# Patient Record
Sex: MALE | Race: WHITE | Employment: OTHER
[De-identification: names, ages, dates, MRNs, and addresses within clinical notes are randomized per-mention and may not be internally consistent; named-entity substitution may affect disease eponyms.]

---

## 2017-01-19 ENCOUNTER — CARE COORDINATION (OUTPATIENT)
Dept: CARE COORDINATION | Facility: CLINIC | Age: 77
End: 2017-01-19

## 2017-05-02 ENCOUNTER — OFFICE VISIT (OUTPATIENT)
Dept: INTERNAL MEDICINE CLINIC | Age: 77
End: 2017-05-02
Payer: MEDICARE

## 2017-05-02 VITALS
OXYGEN SATURATION: 98 % | HEART RATE: 98 BPM | DIASTOLIC BLOOD PRESSURE: 60 MMHG | SYSTOLIC BLOOD PRESSURE: 118 MMHG | HEIGHT: 70 IN | RESPIRATION RATE: 16 BRPM | WEIGHT: 202 LBS | BODY MASS INDEX: 28.92 KG/M2 | TEMPERATURE: 98.2 F

## 2017-05-02 DIAGNOSIS — J44.9 CHRONIC OBSTRUCTIVE PULMONARY DISEASE, UNSPECIFIED COPD TYPE (HCC): ICD-10-CM

## 2017-05-02 DIAGNOSIS — Z98.890 S/P DISCECTOMY: ICD-10-CM

## 2017-05-02 DIAGNOSIS — M54.2 NECK PAIN: Primary | ICD-10-CM

## 2017-05-02 DIAGNOSIS — I48.0 PAROXYSMAL ATRIAL FIBRILLATION (HCC): ICD-10-CM

## 2017-05-02 DIAGNOSIS — M54.50 CHRONIC MIDLINE LOW BACK PAIN WITHOUT SCIATICA: ICD-10-CM

## 2017-05-02 DIAGNOSIS — G62.9 NEUROPATHY: ICD-10-CM

## 2017-05-02 DIAGNOSIS — I20.9 ANGINA PECTORIS (HCC): ICD-10-CM

## 2017-05-02 DIAGNOSIS — G89.29 CHRONIC MIDLINE LOW BACK PAIN WITHOUT SCIATICA: ICD-10-CM

## 2017-05-02 DIAGNOSIS — G47.00 INSOMNIA, UNSPECIFIED TYPE: ICD-10-CM

## 2017-05-02 PROCEDURE — 99215 OFFICE O/P EST HI 40 MIN: CPT | Performed by: INTERNAL MEDICINE

## 2017-05-02 RX ORDER — TRAZODONE HYDROCHLORIDE 100 MG/1
100 TABLET ORAL NIGHTLY
Qty: 30 TABLET | Refills: 0 | Status: SHIPPED | OUTPATIENT
Start: 2017-05-02 | End: 2017-10-09 | Stop reason: SDUPTHER

## 2017-05-02 ASSESSMENT — PATIENT HEALTH QUESTIONNAIRE - PHQ9
1. LITTLE INTEREST OR PLEASURE IN DOING THINGS: 0
SUM OF ALL RESPONSES TO PHQ9 QUESTIONS 1 & 2: 0
SUM OF ALL RESPONSES TO PHQ QUESTIONS 1-9: 0
2. FEELING DOWN, DEPRESSED OR HOPELESS: 0

## 2017-06-09 ENCOUNTER — TELEPHONE (OUTPATIENT)
Dept: INTERNAL MEDICINE CLINIC | Age: 77
End: 2017-06-09

## 2017-06-20 ENCOUNTER — HOSPITAL ENCOUNTER (OUTPATIENT)
Dept: MRI IMAGING | Age: 77
Discharge: HOME | End: 2017-06-20
Payer: MEDICARE

## 2017-06-20 DIAGNOSIS — G89.29 CHRONIC MIDLINE LOW BACK PAIN WITHOUT SCIATICA: ICD-10-CM

## 2017-06-20 DIAGNOSIS — M54.50 CHRONIC MIDLINE LOW BACK PAIN WITHOUT SCIATICA: ICD-10-CM

## 2017-06-20 DIAGNOSIS — G47.00 INSOMNIA, UNSPECIFIED TYPE: ICD-10-CM

## 2017-06-20 DIAGNOSIS — M54.2 NECK PAIN: ICD-10-CM

## 2017-06-20 DIAGNOSIS — Z98.890 S/P DISCECTOMY: ICD-10-CM

## 2017-06-20 DIAGNOSIS — G62.9 NEUROPATHY: ICD-10-CM

## 2017-06-20 LAB
CREAT SERPL-MCNC: 0.9 MG/DL (ref 0.7–1.2)
GFR AFRICAN AMERICAN: >60 ML/MIN
GFR NON-AFRICAN AMERICAN: >60 ML/MIN
GFR SERPL CREATININE-BSD FRML MDRD: NORMAL ML/MIN/{1.73_M2}
GFR SERPL CREATININE-BSD FRML MDRD: NORMAL ML/MIN/{1.73_M2}

## 2017-06-20 PROCEDURE — 72156 MRI NECK SPINE W/O & W/DYE: CPT

## 2017-06-20 PROCEDURE — 36415 COLL VENOUS BLD VENIPUNCTURE: CPT

## 2017-06-20 PROCEDURE — 6360000004 HC RX CONTRAST MEDICATION: Performed by: INTERNAL MEDICINE

## 2017-06-20 PROCEDURE — 72157 MRI CHEST SPINE W/O & W/DYE: CPT

## 2017-06-20 PROCEDURE — 82565 ASSAY OF CREATININE: CPT

## 2017-06-20 PROCEDURE — A9579 GAD-BASE MR CONTRAST NOS,1ML: HCPCS | Performed by: INTERNAL MEDICINE

## 2017-06-20 PROCEDURE — 2580000003 HC RX 258: Performed by: INTERNAL MEDICINE

## 2017-06-20 RX ORDER — SODIUM CHLORIDE 0.9 % (FLUSH) 0.9 %
10 SYRINGE (ML) INJECTION ONCE
Status: COMPLETED | OUTPATIENT
Start: 2017-06-20 | End: 2017-06-20

## 2017-06-20 RX ADMIN — GADOPENTETATE DIMEGLUMINE 18 ML: 469.01 INJECTION INTRAVENOUS at 14:37

## 2017-06-20 RX ADMIN — Medication 10 ML: at 14:42

## 2017-09-05 ENCOUNTER — OFFICE VISIT (OUTPATIENT)
Dept: INTERNAL MEDICINE CLINIC | Age: 77
End: 2017-09-05
Payer: MEDICARE

## 2017-09-05 VITALS
SYSTOLIC BLOOD PRESSURE: 132 MMHG | BODY MASS INDEX: 28.56 KG/M2 | TEMPERATURE: 97 F | DIASTOLIC BLOOD PRESSURE: 74 MMHG | HEIGHT: 71 IN | WEIGHT: 204 LBS | RESPIRATION RATE: 16 BRPM | HEART RATE: 76 BPM

## 2017-09-05 DIAGNOSIS — G62.9 NEUROPATHY: Primary | ICD-10-CM

## 2017-09-05 DIAGNOSIS — I10 ESSENTIAL HYPERTENSION: ICD-10-CM

## 2017-09-05 DIAGNOSIS — M54.2 NECK PAIN: ICD-10-CM

## 2017-09-05 DIAGNOSIS — I48.91 ATRIAL FIBRILLATION, UNSPECIFIED TYPE (HCC): ICD-10-CM

## 2017-09-05 PROCEDURE — 99214 OFFICE O/P EST MOD 30 MIN: CPT | Performed by: INTERNAL MEDICINE

## 2017-09-07 ENCOUNTER — TELEPHONE (OUTPATIENT)
Dept: INTERNAL MEDICINE CLINIC | Age: 77
End: 2017-09-07

## 2017-09-07 DIAGNOSIS — E78.00 HIGH CHOLESTEROL: ICD-10-CM

## 2017-09-07 DIAGNOSIS — I10 ESSENTIAL HYPERTENSION: ICD-10-CM

## 2017-09-07 DIAGNOSIS — R20.0 LUE NUMBNESS: ICD-10-CM

## 2017-09-07 DIAGNOSIS — I25.709 CORONARY ARTERY DISEASE INVOLVING CORONARY BYPASS GRAFT WITH ANGINA PECTORIS, UNSPECIFIED WHETHER NATIVE OR TRANSPLANTED HEART (HCC): Primary | ICD-10-CM

## 2017-09-07 DIAGNOSIS — R79.89 LOW SERUM TESTOSTERONE LEVEL: ICD-10-CM

## 2017-09-07 DIAGNOSIS — R20.0 RUE NUMBNESS: ICD-10-CM

## 2017-09-26 LAB — VITAMIN B-12: 804

## 2017-10-05 DIAGNOSIS — I10 ESSENTIAL HYPERTENSION: ICD-10-CM

## 2017-10-05 DIAGNOSIS — E78.00 HIGH CHOLESTEROL: ICD-10-CM

## 2017-10-05 DIAGNOSIS — I25.709 CORONARY ARTERY DISEASE INVOLVING CORONARY BYPASS GRAFT WITH ANGINA PECTORIS, UNSPECIFIED WHETHER NATIVE OR TRANSPLANTED HEART (HCC): ICD-10-CM

## 2017-10-09 RX ORDER — TRAZODONE HYDROCHLORIDE 100 MG/1
TABLET ORAL
Qty: 30 TABLET | Refills: 3 | Status: SHIPPED | OUTPATIENT
Start: 2017-10-09 | End: 2018-04-23 | Stop reason: SDUPTHER

## 2018-04-03 ENCOUNTER — HOSPITAL ENCOUNTER (OUTPATIENT)
Dept: VASCULAR LAB | Age: 78
Discharge: HOME | End: 2018-04-03
Payer: MEDICARE

## 2018-04-03 DIAGNOSIS — I65.23 BILATERAL CAROTID ARTERY STENOSIS: Primary | ICD-10-CM

## 2018-04-03 PROCEDURE — 93880 EXTRACRANIAL BILAT STUDY: CPT

## 2018-04-23 ENCOUNTER — OFFICE VISIT (OUTPATIENT)
Dept: INTERNAL MEDICINE CLINIC | Age: 78
End: 2018-04-23
Payer: MEDICARE

## 2018-04-23 VITALS
WEIGHT: 199 LBS | TEMPERATURE: 98.1 F | HEIGHT: 71 IN | SYSTOLIC BLOOD PRESSURE: 128 MMHG | DIASTOLIC BLOOD PRESSURE: 64 MMHG | RESPIRATION RATE: 16 BRPM | HEART RATE: 68 BPM | BODY MASS INDEX: 27.86 KG/M2

## 2018-04-23 DIAGNOSIS — I10 ESSENTIAL HYPERTENSION: Primary | ICD-10-CM

## 2018-04-23 DIAGNOSIS — I65.21 STENOSIS OF RIGHT CAROTID ARTERY: ICD-10-CM

## 2018-04-23 DIAGNOSIS — E78.00 HIGH CHOLESTEROL: ICD-10-CM

## 2018-04-23 PROCEDURE — 99214 OFFICE O/P EST MOD 30 MIN: CPT | Performed by: INTERNAL MEDICINE

## 2018-04-23 RX ORDER — TRAZODONE HYDROCHLORIDE 100 MG/1
TABLET ORAL
Qty: 30 TABLET | Refills: 3 | Status: SHIPPED | OUTPATIENT
Start: 2018-04-23 | End: 2019-11-04 | Stop reason: ALTCHOICE

## 2018-04-23 RX ORDER — ATORVASTATIN CALCIUM 40 MG/1
40 TABLET, FILM COATED ORAL DAILY
COMMUNITY
End: 2018-04-23 | Stop reason: SDUPTHER

## 2018-04-23 RX ORDER — ATORVASTATIN CALCIUM 40 MG/1
40 TABLET, FILM COATED ORAL DAILY
Qty: 90 TABLET | Refills: 1 | Status: SHIPPED | OUTPATIENT
Start: 2018-04-23 | End: 2018-06-29 | Stop reason: DRUGHIGH

## 2018-05-23 ENCOUNTER — TELEPHONE (OUTPATIENT)
Dept: INTERNAL MEDICINE CLINIC | Age: 78
End: 2018-05-23

## 2018-05-29 ENCOUNTER — OFFICE VISIT (OUTPATIENT)
Dept: NEUROLOGY | Age: 78
End: 2018-05-29
Payer: MEDICARE

## 2018-05-29 VITALS
HEART RATE: 57 BPM | SYSTOLIC BLOOD PRESSURE: 119 MMHG | BODY MASS INDEX: 27.69 KG/M2 | HEIGHT: 71 IN | WEIGHT: 197.8 LBS | DIASTOLIC BLOOD PRESSURE: 67 MMHG

## 2018-05-29 DIAGNOSIS — R52 PAIN: ICD-10-CM

## 2018-05-29 DIAGNOSIS — R20.2 PARESTHESIA: Primary | ICD-10-CM

## 2018-05-29 PROCEDURE — 99204 OFFICE O/P NEW MOD 45 MIN: CPT | Performed by: PSYCHIATRY & NEUROLOGY

## 2018-05-29 RX ORDER — VITAMIN B COMPLEX
1 CAPSULE ORAL DAILY
COMMUNITY

## 2018-05-29 RX ORDER — MAG HYDROX/ALUMINUM HYD/SIMETH 400-400-40
1 SUSPENSION, ORAL (FINAL DOSE FORM) ORAL 2 TIMES DAILY
COMMUNITY

## 2018-05-29 RX ORDER — ACETAMINOPHEN 500 MG
500 TABLET ORAL EVERY 6 HOURS PRN
COMMUNITY
End: 2021-09-08 | Stop reason: SDUPTHER

## 2018-06-04 DIAGNOSIS — E78.00 HIGH CHOLESTEROL: ICD-10-CM

## 2018-06-04 DIAGNOSIS — I10 ESSENTIAL HYPERTENSION: ICD-10-CM

## 2018-06-04 LAB
ALBUMIN SERPL-MCNC: NORMAL G/DL
ALP BLD-CCNC: NORMAL U/L
ALT SERPL-CCNC: NORMAL U/L
ANION GAP SERPL CALCULATED.3IONS-SCNC: NORMAL MMOL/L
AST SERPL-CCNC: NORMAL U/L
BASOPHILS ABSOLUTE: NORMAL /ΜL
BASOPHILS RELATIVE PERCENT: NORMAL %
BILIRUB SERPL-MCNC: NORMAL MG/DL (ref 0.1–1.4)
BUN BLDV-MCNC: NORMAL MG/DL
CALCIUM SERPL-MCNC: NORMAL MG/DL
CHLORIDE BLD-SCNC: NORMAL MMOL/L
CHOLESTEROL, TOTAL: 138 MG/DL
CHOLESTEROL/HDL RATIO: 2.4
CO2: NORMAL MMOL/L
CREAT SERPL-MCNC: NORMAL MG/DL
EOSINOPHILS ABSOLUTE: NORMAL /ΜL
EOSINOPHILS RELATIVE PERCENT: NORMAL %
GFR CALCULATED: NORMAL
GLUCOSE BLD-MCNC: NORMAL MG/DL
HCT VFR BLD CALC: NORMAL % (ref 41–53)
HDLC SERPL-MCNC: 57 MG/DL (ref 35–70)
HEMOGLOBIN: NORMAL G/DL (ref 13.5–17.5)
LDL CHOLESTEROL CALCULATED: 66 MG/DL (ref 0–160)
LYMPHOCYTES ABSOLUTE: NORMAL /ΜL
LYMPHOCYTES RELATIVE PERCENT: NORMAL %
MCH RBC QN AUTO: NORMAL PG
MCHC RBC AUTO-ENTMCNC: NORMAL G/DL
MCV RBC AUTO: NORMAL FL
MONOCYTES ABSOLUTE: NORMAL /ΜL
MONOCYTES RELATIVE PERCENT: NORMAL %
NEUTROPHILS ABSOLUTE: NORMAL /ΜL
NEUTROPHILS RELATIVE PERCENT: NORMAL %
PLATELET # BLD: NORMAL K/ΜL
PMV BLD AUTO: NORMAL FL
POTASSIUM SERPL-SCNC: NORMAL MMOL/L
RBC # BLD: NORMAL 10^6/ΜL
SODIUM BLD-SCNC: NORMAL MMOL/L
TESTOSTERONE FREE: NORMAL
TESTOSTERONE TOTAL: NORMAL
TOTAL PROTEIN: NORMAL
TRIGL SERPL-MCNC: 74 MG/DL
TSH SERPL DL<=0.05 MIU/L-ACNC: NORMAL UIU/ML
VLDLC SERPL CALC-MCNC: NORMAL MG/DL
WBC # BLD: NORMAL 10^3/ML

## 2018-06-29 ENCOUNTER — TELEPHONE (OUTPATIENT)
Dept: PHARMACY | Facility: CLINIC | Age: 78
End: 2018-06-29

## 2018-06-29 DIAGNOSIS — Z71.89 ENCOUNTER FOR MEDICATION REVIEW AND COUNSELING: ICD-10-CM

## 2018-06-29 PROCEDURE — 1111F DSCHRG MED/CURRENT MED MERGE: CPT

## 2018-06-29 RX ORDER — AMOXICILLIN 250 MG
1 CAPSULE ORAL DAILY PRN
COMMUNITY
End: 2019-11-04

## 2018-06-29 RX ORDER — ATORVASTATIN CALCIUM 40 MG/1
20 TABLET, FILM COATED ORAL DAILY
COMMUNITY
End: 2020-01-20

## 2018-06-29 RX ORDER — LIDOCAINE 40 MG/G
CREAM TOPICAL DAILY PRN
COMMUNITY

## 2018-06-29 RX ORDER — CLOPIDOGREL BISULFATE 75 MG/1
75 TABLET ORAL DAILY
COMMUNITY
End: 2019-11-04

## 2018-06-29 NOTE — TELEPHONE ENCOUNTER
CLINICAL PHARMACY NOTE  Post-Discharge Transitions of Care (SHRUTI)    Non-face-to-face services provided:  Assessment and support for treatment adherence and medication management-Medication reconciliation completed 6/29/18      Dr. Bart Haas MD, per chart review no PCP hospital follow up appointment scheduled. Patient was recently released from [facility] where he states Plavix was started and his atorvastatin was increased to 80 mg daily from 40 mg daily. Patient states the past 4 days he has been opening his diltiazem 120 mg capsule and dumping some sprinkles out before taking due to being lightheaded- he states this is helping. I informed patient he needs to see an MD asap. His atorvastatin and diltiazem interact. AtorvaSTATin may increase the serum concentration of DilTIAZem. DilTIAZem may increase the serum concentration of AtorvaSTATin. I would recommend decreasing atorvastatin dose back to 40 mg daily or changing to a different high-intensity statin that will not have same interaction (rosuvastatin, if insurance covers.)    Please let me know if I can perform any further outreach. Thank you,  Tye Woo, PharmD, [address] [place], [facility], [facility] Pharmacist  Direct: [phone]  Department, toll free: [phone], option 7     =======================================================    Subjective/Objective:  Shivani Hebert is a 68 y.o. male. Patient was discharged from [facility] [address] [place] [address] on 6/14/18. Patient outreach to review discharge medications and provide medication review and management. Spoke with patient    Allergies   Allergen Reactions    Oxycodone-Acetaminophen Other (See Comments)     Pain in the chest, ears, dizzy.      Tramadol Hcl Other (See Comments)    Codeine Nausea And Vomiting       Medication Sig    clopidogrel (PLAVIX) 75 MG tablet  Added while in hospital. Take 75 mg by mouth daily    senna-docusate (PERICOLACE) 8.6-50 MG per tablet Take 1 tablet by

## 2018-07-31 ENCOUNTER — OFFICE VISIT (OUTPATIENT)
Dept: NEUROLOGY | Age: 78
End: 2018-07-31
Payer: MEDICARE

## 2018-07-31 DIAGNOSIS — M54.12 CERVICAL RADICULOPATHY: Primary | ICD-10-CM

## 2018-07-31 PROCEDURE — 95886 MUSC TEST DONE W/N TEST COMP: CPT | Performed by: PSYCHIATRY & NEUROLOGY

## 2018-07-31 PROCEDURE — 95913 NRV CNDJ TEST 13/> STUDIES: CPT | Performed by: PSYCHIATRY & NEUROLOGY

## 2018-08-06 ENCOUNTER — OFFICE VISIT (OUTPATIENT)
Dept: NEUROLOGY | Age: 78
End: 2018-08-06

## 2018-08-06 VITALS
BODY MASS INDEX: 27.35 KG/M2 | SYSTOLIC BLOOD PRESSURE: 115 MMHG | WEIGHT: 195.4 LBS | DIASTOLIC BLOOD PRESSURE: 61 MMHG | HEART RATE: 62 BPM | HEIGHT: 71 IN

## 2018-08-06 DIAGNOSIS — R20.2 PARESTHESIA: Primary | ICD-10-CM

## 2018-08-06 PROCEDURE — 99999 PR OFFICE/OUTPT VISIT,PROCEDURE ONLY: CPT | Performed by: PSYCHIATRY & NEUROLOGY

## 2018-08-06 NOTE — PROGRESS NOTES
facility-administered medications for this visit. LABS & TESTS:      Lab Results   Component Value Date    WBC  05/20/2016      Comment:      see scan    HGB 14.7 12/03/2012    HCT 44.6 12/03/2012    MCV 94.9 12/03/2012     12/03/2012       REVIEW OF SYSTEMS:      CONSTITUTIONAL Weight change: absent, Appetite change: absent, Fatigue: absent    HEENT Ears: normal, Visual disturbance: absent    RESPIRATORY Shortness of breath: absent, Cough: absent    CARDIOVASCULAR Chest pain: absent, Leg swelling :absent    GI Constipation: absent, Diarrhea: absent, Swallowing change: absent     Urinary frequency: absent, Urinary urgency: absent, Urinary incontinence: absent    MUSCULOSKELETAL Neck pain: present, Back pain: present, Stiffness: present, Muscle pain: present, Joint pain: present Restless legs: absent    DERMATOLOGIC Hair loss: absent, Skin changes: absent    NEUROLOGIC Memory loss: absent, Confusion: absent, Seizures: absent Trouble walking or imbalance: absent, Dizziness: absent, Weakness: absent, Numbness: present Tremor: absent, Spasm: absent, Speech difficulty: absent, Headache: absent, Light sensitivity: absent    PSYCHIATRIC Anxiety: absent, Hallucination: absent, Mood disorder: absent    HEMATOLOGIC Abnormal bleeding: absent, Anemia: absent, Clotting disorder: absent, Lymph gland changes: absent       VITALS  /61   Pulse 62   Ht 5' 10.5\" (1.791 m)   Wt 195 lb 6.4 oz (88.6 kg)   BMI 27.64 kg/m²       PHYSICAL EXAMINATIONS:     General appearance: cooperative  Skin: no rash or skin lesions. HEENT: normocephalic  Optic Fundi: deferred  Neck: supple, no cervcical adenopathy or carotid bruit  Lungs: clear to auscultation  Heart: Regular rate and rhythm, normal S1, S2. No murmurs, clicks or gallops. Peripheral pulses: radial pulses palpable  Abdominal: BS present, soft, NT, ND  Extremities: no edema    NEUROLOGICAL EXAMINATION:     MS: awake, alert and oriented.  No aphasia, dysarthria, or

## 2018-08-07 NOTE — PROGRESS NOTES
median motor studies showed normal DML's, CMAP amplitudes and motor nerve conduction velocities. Bilateral ulnar motor studies showed normal DML's, CMAP amplitudes and motor nerve conduction velocities. Left sural antidromic sensory response was absent. Right sural antidromic sensory response showed normal peak latency, amplitude and borderline normal sensory nerve conduction velocity. Right median and left median orthodromic transcarpal sensory responses showed prolonged peak latencies, low amplitude and slow sensory nerve conduction velocities. Bilateral ulnar orthodromic transcarpal sensory responses showed normal peak latencies, amplitudes and sensory nerve conduction velocities. Monopolar EMG study of the selected  muscles revealed evidence of active denervation in left triceps, biceps and right triceps muscles with evidence of chronic reinnervation. Full interference pattern was seen. 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    3- There is also an evidence of mild bilateral median sensory neuropathies at flexor retinaculum i.e. carpal tunnel syndrome    __________________________________  Rj Moe MD  Diplomat [employer] Board of Psychiatry and Neurology  [address] [facility]

## 2018-08-13 ENCOUNTER — OFFICE VISIT (OUTPATIENT)
Dept: NEUROLOGY | Age: 78
End: 2018-08-13
Payer: MEDICARE

## 2018-08-13 VITALS
HEART RATE: 71 BPM | SYSTOLIC BLOOD PRESSURE: 114 MMHG | WEIGHT: 196 LBS | DIASTOLIC BLOOD PRESSURE: 72 MMHG | BODY MASS INDEX: 27.44 KG/M2 | HEIGHT: 71 IN

## 2018-08-13 DIAGNOSIS — G62.9 PERIPHERAL POLYNEUROPATHY: Primary | ICD-10-CM

## 2018-08-13 DIAGNOSIS — M54.12 CERVICAL RADICULOPATHY: ICD-10-CM

## 2018-08-13 PROCEDURE — 99214 OFFICE O/P EST MOD 30 MIN: CPT | Performed by: PSYCHIATRY & NEUROLOGY

## 2018-08-13 NOTE — PATIENT INSTRUCTIONS
1. Consider following up with pain management. 2. Wear C collar for neck protection   3. Lidocaine topical 4-5 times a day. 4. Continue gabapentin at current dose.     Return as needed    Nelson Lott MD, MS

## 2018-08-13 NOTE — PROGRESS NOTES
[facility]            [facility], [address] [facility], [address] [facility]          Dept: [phone]          Dept Fax: [phone]        MD Edith Lee MD Ahmed B. Ruthell Core, MD Albertine Meckel, MD Grayson Bence, MD Phylicia Hernandez, CNP               NEUROLOGY FOLLOW UP NOTE                                              PATIENT NAME: Matias Leach   PATIENT MRN: [id number]  FOLLOW UP TODAY: 8/13/2018     Dear Dr. Tiffanie Edgar MD,     I had the pleasure of seeing your patient Matias Leach, who comes for follow up. CHIEF COMPLAINT: Follow-up and Numbness     INITIAL & INTERVAL HISTORY:     Matias Leach is a 68year old [facility], I saw him on 7/31/2018, pt returns for follow up regarding tingling/numbness. 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 Pt has bilateral arms/legs tingling/numbness remain the same, he also has right shoulder, left upper arm pain, he used to lidocaine topical with some help. Pt is on gabapentin and he on slightly lower evening dose without significant changes in his symptoms. No weakness, no issues with bowel movement and urination.      Tingling/numbness  Onset: more than 20 years ago  Features: constant tingling/numbness in both arms/legs, shoulders, more on left, in all fingers, and all toes in left, also weakness, because sometimes he dropped things in both hands. Better at night and laying down.    Risk factors: neck injury about 50 years ago when he was playing sport, 40 years ago, he had rotated cuff bilaterally, he had neck surgery s/p bilateral laminectomies at Reactions    Oxycodone-Acetaminophen Other (See Comments)     Pain in the chest, ears, dizzy.  Tramadol Hcl Other (See Comments)    Codeine Nausea And Vomiting       MEDICATIONS:   Current Outpatient Prescriptions   Medication Sig Dispense Refill    clopidogrel (PLAVIX) 75 MG tablet Take 75 mg by mouth daily      senna-docusate (PERICOLACE) 8.6-50 MG per tablet Take 1 tablet by mouth daily as needed for Constipation      atorvastatin (LIPITOR) 40 MG tablet Take 80 mg by mouth daily      lidocaine (LMX) 4 % cream Apply topically daily as needed for Pain Apply topically as needed.  b complex vitamins capsule Take 1 capsule by mouth daily      Misc Natural Products (PUMPKIN SEED OIL PO) Take by mouth daily      [place] 450 MG CAPS Take 1 capsule by mouth 2 times daily       acetaminophen (TYLENOL) 500 MG tablet Take 500 mg by mouth every 6 hours as needed for Pain      traZODone (DESYREL) 100 MG tablet TAKE 1 TABLET BY MOUTH AT BEDTIME NIGHTLY 30 tablet 3    diltiazem (CARDIZEM CD) 120 MG extended release capsule Take 1 capsule by mouth daily      silodosin (RAPAFLO) 8 MG CAPS Take 8 mg by mouth every evening       dabigatran (PRADAXA) 150 MG capsule Take 150 mg by mouth 2 times daily      Magnesium 500 MG TABS Take 500 mg by mouth       aspirin 81 MG tablet Take 81 mg by mouth daily      gabapentin (NEURONTIN) 300 MG capsule Take 900 mg by mouth 3 times daily. [place] Krill Oil 500 MG CAPS Take 1 each by mouth.  nitroGLYCERIN (NITROSTAT) 0.4 MG SL tablet Place 0.4 mg under the tongue every 5 minutes as needed.  budesonide-formoterol (SYMBICORT) 160-4.5 MCG/ACT AERO Inhale 2 puffs into the lungs 2 times daily.  Cholecalciferol (VITAMIN D3) 2000 UNITS TABS Take 1 tablet by mouth daily        No current facility-administered medications for this visit.       LABS & TESTS:      Lab Results   Component Value Date    WBC  05/20/2016      Comment:      see scan    HGB 14.7 12/03/2012    HCT 44.6 12/03/2012    MCV 94.9 12/03/2012     12/03/2012     REVIEW OF SYSTEMS:      CONSTITUTIONAL Weight change: absent, Appetite change: absent, Fatigue: absent    HEENT Ears: diminished, Visual disturbance: absent    RESPIRATORY Shortness of breath: absent, Cough: absent    CARDIOVASCULAR Chest pain: absent, Leg swelling :absent    GI Constipation: absent, Diarrhea: absent, Swallowing change: absent     Urinary frequency: absent, Urinary urgency: absent, Urinary incontinence: absent    MUSCULOSKELETAL Neck pain: present, Back pain: present, Stiffness: present, Muscle pain: present, Joint pain: present Restless legs: absent    DERMATOLOGIC Hair loss: absent, Skin changes: absent    NEUROLOGIC Memory loss: absent, Confusion: absent, Seizures: absent Trouble walking or imbalance: absent, Dizziness: absent, Weakness: absent, Numbness: absent Tremor: absent, Spasm: absent, Speech difficulty: absent, Headache: absent, Light sensitivity: absent    PSYCHIATRIC Anxiety: absent, Hallucination: absent, Mood disorder: absent    HEMATOLOGIC Abnormal bleeding: absent, Anemia: absent, Clotting disorder: absent, Lymph gland changes: absent       VITALS  /72 (Site: Left Arm, Position: Sitting)   Pulse 71   Ht 5' 10.5\" (1.791 m)   Wt 196 lb (88.9 kg)   BMI 27.73 kg/m²       PHYSICAL EXAMINATIONS:     General appearance: cooperative, wears C collar. Skin: no rash or skin lesions. HEENT: normocephalic  Optic Fundi: deferred  Neck: supple, no cervcical adenopathy or carotid bruit  Lungs: clear to auscultation  Heart: Regular rate and rhythm, normal S1, S2. No murmurs, clicks or gallops. Peripheral pulses: radial pulses palpable  Abdominal: BS present, soft, NT, ND  Extremities: no edema    NEUROLOGICAL EXAMINATION:     MS: awake, alert and oriented.  No aphasia, dysarthria, or neglect  CNs: PERRLA, EOMI, VF full, sensation intact, face symmetric, hearing intact, soft palate rises on phonation,

## 2018-09-21 ENCOUNTER — TELEPHONE (OUTPATIENT)
Dept: INTERNAL MEDICINE CLINIC | Age: 78
End: 2018-09-21

## 2018-09-21 DIAGNOSIS — E78.5 HYPERLIPIDEMIA, UNSPECIFIED HYPERLIPIDEMIA TYPE: ICD-10-CM

## 2018-09-21 DIAGNOSIS — I48.0 PAROXYSMAL ATRIAL FIBRILLATION (HCC): ICD-10-CM

## 2018-09-21 DIAGNOSIS — E55.9 VITAMIN D DEFICIENCY: ICD-10-CM

## 2018-09-21 DIAGNOSIS — E78.00 PURE HYPERCHOLESTEROLEMIA: ICD-10-CM

## 2018-09-21 DIAGNOSIS — R79.89 LOW TESTOSTERONE: ICD-10-CM

## 2018-09-21 DIAGNOSIS — J44.9 CHRONIC OBSTRUCTIVE PULMONARY DISEASE, UNSPECIFIED COPD TYPE (HCC): Primary | ICD-10-CM

## 2018-09-25 ENCOUNTER — HOSPITAL ENCOUNTER (OUTPATIENT)
Age: 78
Setting detail: SPECIMEN
Discharge: HOME | End: 2018-09-25
Payer: MEDICARE

## 2018-09-25 DIAGNOSIS — I48.0 PAROXYSMAL ATRIAL FIBRILLATION (HCC): ICD-10-CM

## 2018-09-25 DIAGNOSIS — J44.9 CHRONIC OBSTRUCTIVE PULMONARY DISEASE, UNSPECIFIED COPD TYPE (HCC): ICD-10-CM

## 2018-09-25 DIAGNOSIS — R79.89 LOW TESTOSTERONE: ICD-10-CM

## 2018-09-25 DIAGNOSIS — E78.00 PURE HYPERCHOLESTEROLEMIA: ICD-10-CM

## 2018-09-25 DIAGNOSIS — E78.5 HYPERLIPIDEMIA, UNSPECIFIED HYPERLIPIDEMIA TYPE: ICD-10-CM

## 2018-09-25 DIAGNOSIS — E55.9 VITAMIN D DEFICIENCY: ICD-10-CM

## 2018-09-25 LAB
ALBUMIN SERPL-MCNC: 3.9 G/DL (ref 3.5–5.2)
ALBUMIN/GLOBULIN RATIO: 1.2 (ref 1–2.5)
ALP BLD-CCNC: 127 U/L (ref 40–129)
ALT SERPL-CCNC: 19 U/L (ref 5–41)
ANION GAP SERPL CALCULATED.3IONS-SCNC: 12 MMOL/L (ref 9–17)
AST SERPL-CCNC: 23 U/L
BILIRUB SERPL-MCNC: 0.53 MG/DL (ref 0.3–1.2)
BUN BLDV-MCNC: 14 MG/DL (ref 8–23)
BUN/CREAT BLD: NORMAL (ref 9–20)
CALCIUM SERPL-MCNC: 9.1 MG/DL (ref 8.6–10.4)
CHLORIDE BLD-SCNC: 100 MMOL/L (ref 98–107)
CHOLESTEROL/HDL RATIO: 3.8
CHOLESTEROL: 198 MG/DL
CO2: 27 MMOL/L (ref 20–31)
CREAT SERPL-MCNC: 0.83 MG/DL (ref 0.7–1.2)
GFR AFRICAN AMERICAN: >60 ML/MIN
GFR NON-AFRICAN AMERICAN: >60 ML/MIN
GFR SERPL CREATININE-BSD FRML MDRD: NORMAL ML/MIN/{1.73_M2}
GFR SERPL CREATININE-BSD FRML MDRD: NORMAL ML/MIN/{1.73_M2}
GLUCOSE BLD-MCNC: 92 MG/DL (ref 70–99)
HCT VFR BLD CALC: 47.8 % (ref 40.7–50.3)
HDLC SERPL-MCNC: 52 MG/DL
HEMOGLOBIN: 15.4 G/DL (ref 13–17)
LDL CHOLESTEROL: 128 MG/DL (ref 0–130)
MCH RBC QN AUTO: 30.7 PG (ref 25.2–33.5)
MCHC RBC AUTO-ENTMCNC: 32.2 G/DL (ref 28.4–34.8)
MCV RBC AUTO: 95.2 FL (ref 82.6–102.9)
NRBC AUTOMATED: 0 PER 100 WBC
PDW BLD-RTO: 12.8 % (ref 11.8–14.4)
PLATELET # BLD: 205 K/UL (ref 138–453)
PMV BLD AUTO: 10.9 FL (ref 8.1–13.5)
POTASSIUM SERPL-SCNC: 4.7 MMOL/L (ref 3.7–5.3)
RBC # BLD: 5.02 M/UL (ref 4.21–5.77)
SODIUM BLD-SCNC: 139 MMOL/L (ref 135–144)
TESTOSTERONE TOTAL: 439 NG/DL (ref 220–1000)
TOTAL PROTEIN: 7.2 G/DL (ref 6.4–8.3)
TRIGL SERPL-MCNC: 92 MG/DL
VITAMIN D 25-HYDROXY: 41.4 NG/ML (ref 30–100)
VLDLC SERPL CALC-MCNC: NORMAL MG/DL (ref 1–30)
WBC # BLD: 5.4 K/UL (ref 3.5–11.3)

## 2018-10-05 ENCOUNTER — OFFICE VISIT (OUTPATIENT)
Dept: INTERNAL MEDICINE CLINIC | Age: 78
End: 2018-10-05
Payer: MEDICARE

## 2018-10-05 ENCOUNTER — TELEPHONE (OUTPATIENT)
Dept: INTERNAL MEDICINE CLINIC | Age: 78
End: 2018-10-05

## 2018-10-05 ENCOUNTER — HOSPITAL ENCOUNTER (OUTPATIENT)
Age: 78
Setting detail: SPECIMEN
Discharge: HOME | End: 2018-10-05
Payer: MEDICARE

## 2018-10-05 VITALS
TEMPERATURE: 96.9 F | SYSTOLIC BLOOD PRESSURE: 122 MMHG | HEIGHT: 70 IN | HEART RATE: 61 BPM | RESPIRATION RATE: 16 BRPM | DIASTOLIC BLOOD PRESSURE: 71 MMHG | BODY MASS INDEX: 28.29 KG/M2 | OXYGEN SATURATION: 96 % | WEIGHT: 197.6 LBS

## 2018-10-05 DIAGNOSIS — R10.32 LEFT GROIN PAIN: ICD-10-CM

## 2018-10-05 DIAGNOSIS — Z23 NEED FOR PNEUMOCOCCAL VACCINATION: ICD-10-CM

## 2018-10-05 DIAGNOSIS — Z23 NEED FOR INFLUENZA VACCINATION: ICD-10-CM

## 2018-10-05 DIAGNOSIS — S51.011A SKIN TEAR OF RIGHT ELBOW WITHOUT COMPLICATION, INITIAL ENCOUNTER: ICD-10-CM

## 2018-10-05 DIAGNOSIS — L98.9 SCALP LESION: ICD-10-CM

## 2018-10-05 DIAGNOSIS — E78.00 HIGH CHOLESTEROL: Primary | ICD-10-CM

## 2018-10-05 LAB
ALT SERPL-CCNC: 20 U/L (ref 5–41)
AST SERPL-CCNC: 23 U/L
CHOLESTEROL, FASTING: 216 MG/DL
CHOLESTEROL/HDL RATIO: 4
FIBRINOGEN: 313 MG/DL (ref 140–420)
HDLC SERPL-MCNC: 54 MG/DL
HIGH SENSITIVE C-REACTIVE PROTEIN: 1.6 MG/L
HOMOCYSTEINE: 10.1 UMOL/L
LDL CHOLESTEROL: 143 MG/DL (ref 0–130)
TRIGLYCERIDE, FASTING: 94 MG/DL
URIC ACID: 5.4 MG/DL (ref 3.4–7)
VITAMIN D 25-HYDROXY: 36.3 NG/ML (ref 30–100)
VLDLC SERPL CALC-MCNC: ABNORMAL MG/DL (ref 1–30)

## 2018-10-05 PROCEDURE — G0009 ADMIN PNEUMOCOCCAL VACCINE: HCPCS | Performed by: INTERNAL MEDICINE

## 2018-10-05 PROCEDURE — 99214 OFFICE O/P EST MOD 30 MIN: CPT | Performed by: INTERNAL MEDICINE

## 2018-10-05 PROCEDURE — 90688 IIV4 VACCINE SPLT 0.5 ML IM: CPT | Performed by: INTERNAL MEDICINE

## 2018-10-05 PROCEDURE — G0008 ADMIN INFLUENZA VIRUS VAC: HCPCS | Performed by: INTERNAL MEDICINE

## 2018-10-05 PROCEDURE — 90670 PCV13 VACCINE IM: CPT | Performed by: INTERNAL MEDICINE

## 2018-10-05 RX ORDER — ATORVASTATIN CALCIUM 40 MG/1
40 TABLET, FILM COATED ORAL DAILY
Qty: 30 TABLET | Refills: 5 | Status: SHIPPED | OUTPATIENT
Start: 2018-10-05 | End: 2018-10-05 | Stop reason: SDUPTHER

## 2018-10-05 RX ORDER — ALBUTEROL SULFATE 90 UG/1
AEROSOL, METERED RESPIRATORY (INHALATION)
COMMUNITY
Start: 2017-06-26

## 2018-10-05 RX ORDER — FLUTICASONE PROPIONATE 50 MCG
SPRAY, SUSPENSION (ML) NASAL
COMMUNITY
Start: 2015-11-12 | End: 2021-09-20 | Stop reason: SDUPTHER

## 2018-10-05 RX ORDER — ATORVASTATIN CALCIUM 40 MG/1
40 TABLET, FILM COATED ORAL DAILY
Qty: 30 TABLET | Refills: 5 | Status: SHIPPED | OUTPATIENT
Start: 2018-10-05 | End: 2019-04-21 | Stop reason: SDUPTHER

## 2018-10-05 ASSESSMENT — PATIENT HEALTH QUESTIONNAIRE - PHQ9
SUM OF ALL RESPONSES TO PHQ9 QUESTIONS 1 & 2: 0
2. FEELING DOWN, DEPRESSED OR HOPELESS: 0
SUM OF ALL RESPONSES TO PHQ QUESTIONS 1-9: 0
SUM OF ALL RESPONSES TO PHQ QUESTIONS 1-9: 0
1. LITTLE INTEREST OR PLEASURE IN DOING THINGS: 0

## 2018-10-05 NOTE — PROGRESS NOTES
Chronic Disease Visit Information    BP Readings from Last 3 Encounters:   08/13/18 114/72   08/06/18 115/61   05/29/18 119/67          LDL Cholesterol (mg/dL)   Date Value   09/25/2018 128     LDL Calculated (mg/dL)   Date Value   04/27/2018 66     HDL (mg/dL)   Date Value   09/25/2018 52     BUN (mg/dL)   Date Value   09/25/2018 14     CREATININE (mg/dL)   Date Value   09/25/2018 0.83     Glucose (mg/dL)   Date Value   09/25/2018 92   10/10/2011 96            Have you changed or started any medications since your last visit including any over-the-counter medicines, vitamins, or herbal medicines? no   Are you having any side effects from any of your medications? -  no  Have you stopped taking any of your medications? Is so, why? -  no    Have you seen any other physician or provider since your last visit? Yes - Records Obtained  Have you had any other diagnostic tests since your last visit? Yes - Records Obtained  Have you been seen in the emergency room and/or had an admission to a hospital since we last saw you? No  Have you had your annual diabetic retinal (eye) exam? No  Have you had your routine dental cleaning in the past 6 months? yes    Have you activated your [employer] account? If not, what are your barriers?  Yes     Patient Care Team:  Aloma Canavan, MD as PCP - General (Internal Medicine)  Aloma Canavan, MD as PCP - S Attributed Provider  Everlena Opitz, MD as Consulting Physician (Cardiology)         Medical History Review  Past Medical, Family, and Social History reviewed and does contribute to the patient presenting condition    Health Maintenance   Topic Date Due    Pneumococcal low/med risk (1 of 2 - PCV13) 11/07/2005    Flu vaccine (1) 09/01/2018    DTaP/Tdap/Td vaccine (1 - Tdap) 10/24/2019 (Originally 11/7/1959)    Shingles Vaccine (1 of 2 - 2 Dose Series) 10/29/2019 (Originally 11/7/1990)

## 2018-10-05 NOTE — PROGRESS NOTES
by mouth daily      gabapentin (NEURONTIN) 300 MG capsule Take 900 mg by mouth 3 times daily. [place] Krill Oil 500 MG CAPS Take 1 each by mouth.  nitroGLYCERIN (NITROSTAT) 0.4 MG SL tablet Place 0.4 mg under the tongue every 5 minutes as needed.  budesonide-formoterol (SYMBICORT) 160-4.5 MCG/ACT AERO Inhale 2 puffs into the lungs 2 times daily.  Cholecalciferol (VITAMIN D3) 2000 UNITS TABS Take 1 tablet by mouth daily       fluticasone (FLONASE) 50 MCG/ACT nasal spray 1 spray in each nostril      albuterol sulfate HFA (PROAIR HFA) 108 (90 Base) MCG/ACT inhaler 2 puffs as needed      clopidogrel (PLAVIX) 75 MG tablet Take 75 mg by mouth daily      senna-docusate (PERICOLACE) 8.6-50 MG per tablet Take 1 tablet by mouth daily as needed for Constipation      diltiazem (CARDIZEM CD) 120 MG extended release capsule Take 1 capsule by mouth daily        No current facility-administered medications for this visit. Allergies   Allergen Reactions    Oxycodone-Acetaminophen Other (See Comments)     Pain in the chest, ears, dizzy.      Tramadol Hcl Other (See Comments)    Codeine Nausea And Vomiting       Past Medical History:   Diagnosis Date    Asthma     Atrial fibrillation (HCC)     Cataracts, bilateral     COPD (chronic obstructive pulmonary disease) (HCC)     Diverticulosis     Hearing loss     Heart attack ([address]     Hemorrhoids     Hyperlipidemia     Neuropathy     Spinal stenosis        Past Surgical History:   Procedure Laterality Date    CERVICAL DISCECTOMY      CHOLECYSTECTOMY      CORONARY ANGIOPLASTY WITH STENT PLACEMENT  2005    AN to LAD    CORONARY ARTERY BYPASS GRAFT  05/29/2015    HERNIA REPAIR      LAMINECTOMY      TONSILLECTOMY         Family History   Problem Relation Age of Onset    Heart Disease Mother     Heart Disease Maternal Grandfather     Heart Disease Paternal Grandfather     Heart Disease Father     Stroke Maternal Grandmother signed by Lea Beasley MD on 10/5/2018 at 10:53 AM    This note is created with a voice recognition program and while intend to generate a document that accurately reflects the content of the visit, no guarantee can be provided that every mistake has been identified and corrected by editing.

## 2018-10-07 LAB — LIPOPROTEIN (A): 13 MG/DL

## 2019-04-21 DIAGNOSIS — E78.00 HIGH CHOLESTEROL: ICD-10-CM

## 2019-04-22 RX ORDER — ATORVASTATIN CALCIUM 40 MG/1
TABLET, FILM COATED ORAL
Qty: 90 TABLET | Refills: 0 | Status: SHIPPED | OUTPATIENT
Start: 2019-04-22 | End: 2019-05-06

## 2019-05-06 ENCOUNTER — OFFICE VISIT (OUTPATIENT)
Dept: INTERNAL MEDICINE CLINIC | Age: 79
End: 2019-05-06
Payer: MEDICARE

## 2019-05-06 ENCOUNTER — HOSPITAL ENCOUNTER (OUTPATIENT)
Age: 79
Setting detail: SPECIMEN
Discharge: HOME | End: 2019-05-06
Payer: MEDICARE

## 2019-05-06 VITALS
TEMPERATURE: 98.6 F | BODY MASS INDEX: 27.98 KG/M2 | SYSTOLIC BLOOD PRESSURE: 112 MMHG | WEIGHT: 195 LBS | HEART RATE: 56 BPM | RESPIRATION RATE: 16 BRPM | DIASTOLIC BLOOD PRESSURE: 54 MMHG

## 2019-05-06 DIAGNOSIS — E78.00 HIGH CHOLESTEROL: ICD-10-CM

## 2019-05-06 DIAGNOSIS — I10 ESSENTIAL HYPERTENSION: ICD-10-CM

## 2019-05-06 DIAGNOSIS — I48.0 PAROXYSMAL ATRIAL FIBRILLATION (HCC): ICD-10-CM

## 2019-05-06 DIAGNOSIS — I20.8 ANGINA EFFORT: ICD-10-CM

## 2019-05-06 DIAGNOSIS — J44.9 CHRONIC OBSTRUCTIVE PULMONARY DISEASE, UNSPECIFIED COPD TYPE (HCC): ICD-10-CM

## 2019-05-06 DIAGNOSIS — G56.03 BILATERAL CARPAL TUNNEL SYNDROME: Primary | ICD-10-CM

## 2019-05-06 LAB
BUN BLDV-MCNC: 14 MG/DL (ref 8–23)
CREAT SERPL-MCNC: 0.75 MG/DL (ref 0.7–1.2)
GFR AFRICAN AMERICAN: >60 ML/MIN
GFR NON-AFRICAN AMERICAN: >60 ML/MIN
GFR SERPL CREATININE-BSD FRML MDRD: NORMAL ML/MIN/{1.73_M2}
GFR SERPL CREATININE-BSD FRML MDRD: NORMAL ML/MIN/{1.73_M2}

## 2019-05-06 PROCEDURE — 99214 OFFICE O/P EST MOD 30 MIN: CPT | Performed by: INTERNAL MEDICINE

## 2019-05-06 ASSESSMENT — PATIENT HEALTH QUESTIONNAIRE - PHQ9
SUM OF ALL RESPONSES TO PHQ QUESTIONS 1-9: 0
SUM OF ALL RESPONSES TO PHQ QUESTIONS 1-9: 0
SUM OF ALL RESPONSES TO PHQ9 QUESTIONS 1 & 2: 0
2. FEELING DOWN, DEPRESSED OR HOPELESS: 0
1. LITTLE INTEREST OR PLEASURE IN DOING THINGS: 0

## 2019-05-06 NOTE — PROGRESS NOTES
Aracelis Rhodes is a 66 y.o. male who presents for   Chief Complaint   Patient presents with    Hypertension     6 mo check up    Hyperlipidemia     6 mo check up    Cough     had awful cough in dec - jan, went to Urgent care    Dizziness     off and on for a few months, change of position    Other     having CT neck per Dr Nba Gaines tomorrow per Dr Nissa Terry-    Other     left temporal pain    Other     shoulders, arms, hands numb still    Hand Pain     left hand pain, CTS    and follow up of chronic medical problems. Patient Active Problem List   Diagnosis    COPD [facility] disease) [facility]    Asthma    Paroxysmal atrial fibrillation ([facility]    Angina effort ([facility]    Encounter for medication review and counseling     HPI  Here for follow-up on cholesterol and blood pressure denies any new complaints other than bilateral carpal tunnel and numbness and pain in the both hands    Current Outpatient Medications   Medication Sig Dispense Refill    Misc. Devices (WRIST BRACE) MISC Bilateral wrist brace 2 each 0    fluticasone (FLONASE) 50 MCG/ACT nasal spray 1 spray in each nostril      albuterol sulfate HFA (PROAIR HFA) 108 (90 Base) MCG/ACT inhaler 2 puffs as needed      clopidogrel (PLAVIX) 75 MG tablet Take 75 mg by mouth daily      senna-docusate (PERICOLACE) 8.6-50 MG per tablet Take 1 tablet by mouth daily as needed for Constipation      atorvastatin (LIPITOR) 40 MG tablet Take 80 mg by mouth daily      lidocaine (LMX) 4 % cream Apply topically daily as needed for Pain Apply topically as needed.       b complex vitamins capsule Take 1 capsule by mouth daily      Misc Natural Products (PUMPKIN SEED OIL PO) Take by mouth daily      [place] 450 MG CAPS Take 1 capsule by mouth 2 times daily       acetaminophen (TYLENOL) 500 MG tablet Take 500 mg by mouth every 6 hours as needed for Pain      traZODone (DESYREL) 100 MG tablet TAKE 1 TABLET BY MOUTH AT BEDTIME NIGHTLY 30 tablet 3    diltiazem (CARDIZEM CD) 120 MG extended release capsule Take 1 capsule by mouth daily       silodosin (RAPAFLO) 8 MG CAPS Take 8 mg by mouth every evening       dabigatran (PRADAXA) 150 MG capsule Take 150 mg by mouth 2 times daily      Magnesium 500 MG TABS Take 500 mg by mouth       aspirin 81 MG tablet Take 81 mg by mouth daily      gabapentin (NEURONTIN) 300 MG capsule Take 900 mg by mouth 3 times daily. [employer] 500 MG CAPS Take 1 each by mouth.  nitroGLYCERIN (NITROSTAT) 0.4 MG SL tablet Place 0.4 mg under the tongue every 5 minutes as needed.  budesonide-formoterol (SYMBICORT) 160-4.5 MCG/ACT AERO Inhale 2 puffs into the lungs 2 times daily.  Cholecalciferol (VITAMIN D3) 2000 UNITS TABS Take 1 tablet by mouth daily        No current facility-administered medications for this visit. Allergies   Allergen Reactions    Oxycodone-Acetaminophen Other (See Comments)     Pain in the chest, ears, dizzy.      Tramadol Hcl Other (See Comments)    Codeine Nausea And Vomiting       Past Medical History:   Diagnosis Date    Asthma     Atrial fibrillation (HCC)     Cataracts, bilateral     [facility]     Diverticulosis     Hearing loss     Heart attack ([facility]     Hemorrhoids     Hyperlipidemia     Neuropathy     Spinal stenosis        Past Surgical History:   Procedure Laterality Date    CERVICAL DISCECTOMY      CHOLECYSTECTOMY      CORONARY ANGIOPLASTY WITH STENT PLACEMENT  2005    AN to LAD    CORONARY ARTERY BYPASS GRAFT  05/29/2015    HERNIA REPAIR      LAMINECTOMY      TONSILLECTOMY         Family History   Problem Relation Age of Onset    Heart Disease Mother     Heart Disease Maternal Grandfather     Heart Disease Paternal Grandfather     Heart Disease Father     Stroke Maternal Grandmother      ROS   Constitutional:  Negative for fatigue, loss of appetite and unexpected weight change   HEENT : Negative for neck stiffness and pain, no congestion or sinus pressure   Eyes                : No visual disturbance or pain   Cardiovascular: No chest pain or palpitations or leg swelling   Respiratory      : Negative for cough, shortness of breath or wheezing   Gastrointestinal: Negative for abdominal pain, constipation or diarrhea and bloating No nausea or vomiting   Genitourinary:     No urgency or frequency, no burning or hematuria   Musculoskeletal: No arthralgias, back pain or myalgias   Skin                  : Negative for rash or erythema   Neurological    : Negative for dizziness, weakness, tremors ,light headedness or syncope   Psychiatric       : Negative for dysphoric mood, sleep disturbances, nervous or anxious, or decreased concentration   All other review of systems was negative    Objective  Physical Examination:    Nursing note reviewed    BP (!) 112/54   Pulse 56   Temp 98.6 °F (37 °C) (Oral)   Resp 16   Wt 195 lb (88.5 kg)   BMI 27.98 kg/m²   BP Readings from Last 3 Encounters:   05/06/19 (!) 112/54   10/05/18 122/71   08/13/18 114/72         Constitutional:  Ynes Carvajal is oriented to place, person and time ,appears well-developed and well-nourished  HEENT:  Atraumatic and normocephalic, external ears normal bilaterally, nose normal no oropharyngeal exudate and is clear and moist  Eyes:  EOCM normal; conjunctivae normal; PERRLA bilaterally  Neck:  Normal range of motion, neck supple, no JVD and no thyromegaly  Cardiovascular:  RRR, normal heart sounds and intact distal pulses  Pulmonary:  effort normal and breath sounds normal bilaterally,no wheezes or rales, no respiratory distress  Abdominal:  Soft, non-tender; normal bowel sounds, no masses  Musculoskeletal:  Normal range of motion and no edema or tenderness bilaterally  No lymphadenopathy  Neurological:  alert, oriented, and normal reflexes bilaterally  Skin: warm and dry  Psychiatric:  normal mood and effect; behavior normal.    Labs:   No results found for: LABA1C  Lab Results   Component Value Date    CHOL 198 09/25/2018     Lab Results   Component Value Date    HDL 54 10/05/2018     Lab Results   Component Value Date    LDLCALC 66 04/27/2018     Lab Results   Component Value Date    TRIG 92 09/25/2018     No components found for: [place], [place]  Lab Results   Component Value Date    WBC 5.4 09/25/2018    HGB 15.4 09/25/2018    HCT 47.8 09/25/2018    MCV 95.2 09/25/2018     09/25/2018     No results found for: INR, PROTIME  Lab Results   Component Value Date    GLUCOSE 92 09/25/2018    CREATININE 0.83 09/25/2018    BUN 14 09/25/2018     09/25/2018    K 4.7 09/25/2018     09/25/2018    CO2 27 09/25/2018     Lab Results   Component Value Date    ALT 20 10/05/2018    AST 23 10/05/2018    ALKPHOS 127 09/25/2018    BILITOT 0.53 09/25/2018     Lab Results   Component Value Date    LABPROT 6.7 12/03/2012    LABALBU 3.9 09/25/2018     Lab Results   Component Value Date    TSH 1.11 05/20/2016     Assessment:  1. Bilateral carpal tunnel syndrome    2. High cholesterol    3. Chronic obstructive pulmonary disease, unspecified COPD type ([address]    4. Paroxysmal atrial fibrillation ([address]    5. Angina effort ([address]    6.  Essential hypertension        Plan:  Patient had a issues with bilateral carpal tunnel syndrome and also had cervical spondylosis and had multiple surgeries done in the past and also complaining of trigger fingers bilaterally and patient was given prescription for bilateral wrist braces and also discussed about seeing a hand surgeon and patient wants to wait as he is having some testing done for carotid stenoses by Dr. Lizet Rai vascular specialist and will call me back after he gets the CT angiogram done and will decide if he wants to see a hand surgeon at the time  Patient's labs reviewed and cholesterol within normal limits that was done in January by [place]  Patient's COPD is stable and no breathing issues at this time  Patient's atrial fibrillation is stable and continue current treatment and patient will be seeing cardiology soon  Patient has no angina and doing well and blood pressure stable on current medications  Review in 6 months  Quality & Risk Score Accuracy    Last edited 05/06/19 14:51 EDT by Franci Dawson MD              1.  Amber Hough received counseling on the following healthy behaviors: nutrition and exercise    2. Prior labs and health maintenance reviewed. 3.  Discussed use, benefit, and side effects of prescribed medications. Barriers to medication compliance addressed. All his questions were answered. Pt voiced understanding. Amber Hough will continue current medications, diet and exercise. Orders Placed This Encounter   Medications    DISCONTD: Elastic Bandages & Supports (WRIST BRACE//LEFT LARGE) MISC     Sig: Left wrist brace     Dispense:  1 each     Refill:  0    DISCONTD: Misc. Devices (WRIST BRACE) MISC     Sig: Bilateral wrist brace     Dispense:  1 each     Refill:  0    Misc. Devices (WRIST BRACE) MISC     Sig: Bilateral wrist brace     Dispense:  2 each     Refill:  0          Completed Refills               Requested Prescriptions     Signed Prescriptions Disp Refills    Misc. Devices (WRIST BRACE) MISC 2 each 0     Sig: Bilateral wrist brace     4. Patient given educational materials - see patient instructions    5. Was a self-tracking handout given in paper form or via [employer]? NO    No orders of the defined types were placed in this encounter. Return in about 6 months (around 11/6/2019). Patient voiced understanding and agreed to treatment plan. Electronically signed by Franci Dawson MD on 5/6/2019 at 4:43 PM    This note is created with a voice recognition program and while intend to generate a document that accurately reflects the content of the visit, no guarantee can be provided that every mistake has been identified and corrected by editing.

## 2019-05-06 NOTE — PROGRESS NOTES
Visit Information    Have you changed or started any medications since your last visit including any over-the-counter medicines, vitamins, or herbal medicines? no   Are you having any side effects from any of your medications? -  no  Have you stopped taking any of your medications? Is so, why? -  no    Have you seen any other physician or provider since your last visit? Yes - Records Obtained  Have you had any other diagnostic tests since your last visit? Yes - Records Obtained  Have you been seen in the emergency room and/or had an admission to a hospital since we last saw you? Yes - Records Obtained  Have you had your routine dental cleaning in the past 6 months? yes -     Have you activated your [employer] account? If not, what are your barriers?  Yes     Patient Care Team:  Carmen Julian MD as PCP - General (Internal Medicine)  Carmen Julian MD as PCP - [facility] Attributed Provider  Preston Mortimer, MD as Consulting Physician (Cardiology)    Medical History Review  Past Medical, Family, and Social History reviewed and does contribute to the patient presenting condition    Health Maintenance   Topic Date Due    DTaP/Tdap/Td vaccine (1 - Tdap) 10/24/2019 (Originally 11/7/1959)    Shingles Vaccine (1 of 2) 10/29/2019 (Originally 11/7/1990)    Pneumococcal 65+ years Vaccine (2 of 2 - PPSV23) 10/05/2019    Flu vaccine  Completed

## 2019-05-24 ENCOUNTER — HOSPITAL ENCOUNTER (OUTPATIENT)
Age: 79
Setting detail: SPECIMEN
Discharge: HOME | End: 2019-05-24
Payer: MEDICARE

## 2019-05-24 LAB — SEDIMENTATION RATE, ERYTHROCYTE: 5 MM (ref 0–10)

## 2019-06-11 ENCOUNTER — TELEPHONE (OUTPATIENT)
Dept: NEUROLOGY | Age: 79
End: 2019-06-11

## 2019-06-11 NOTE — TELEPHONE ENCOUNTER
Telephone call from patient he is seeing a sure steven tomorrow and needs a copy of his EMG to take with him.     Earline Gomes

## 2019-07-18 DIAGNOSIS — E78.00 HIGH CHOLESTEROL: ICD-10-CM

## 2019-07-19 RX ORDER — ATORVASTATIN CALCIUM 40 MG/1
TABLET, FILM COATED ORAL
Qty: 90 TABLET | Refills: 0 | Status: SHIPPED | OUTPATIENT
Start: 2019-07-19 | End: 2019-10-19 | Stop reason: SDUPTHER

## 2019-10-19 DIAGNOSIS — E78.00 HIGH CHOLESTEROL: ICD-10-CM

## 2019-10-19 RX ORDER — ATORVASTATIN CALCIUM 40 MG/1
TABLET, FILM COATED ORAL
Qty: 90 TABLET | Refills: 0 | Status: SHIPPED | OUTPATIENT
Start: 2019-10-19 | End: 2019-11-04

## 2019-11-04 ENCOUNTER — OFFICE VISIT (OUTPATIENT)
Dept: INTERNAL MEDICINE CLINIC | Age: 79
End: 2019-11-04
Payer: MEDICARE

## 2019-11-04 VITALS
DIASTOLIC BLOOD PRESSURE: 62 MMHG | WEIGHT: 199.4 LBS | TEMPERATURE: 97.6 F | HEIGHT: 71 IN | RESPIRATION RATE: 16 BRPM | BODY MASS INDEX: 27.92 KG/M2 | HEART RATE: 60 BPM | SYSTOLIC BLOOD PRESSURE: 126 MMHG

## 2019-11-04 DIAGNOSIS — M54.2 CHRONIC NECK PAIN: ICD-10-CM

## 2019-11-04 DIAGNOSIS — G89.29 CHRONIC NECK PAIN: ICD-10-CM

## 2019-11-04 DIAGNOSIS — Z23 NEED FOR PNEUMOCOCCAL VACCINATION: ICD-10-CM

## 2019-11-04 DIAGNOSIS — Z23 NEED FOR INFLUENZA VACCINATION: ICD-10-CM

## 2019-11-04 DIAGNOSIS — E78.00 HIGH CHOLESTEROL: ICD-10-CM

## 2019-11-04 DIAGNOSIS — Z98.890 HISTORY OF CARPAL TUNNEL SURGERY: ICD-10-CM

## 2019-11-04 DIAGNOSIS — I10 ESSENTIAL HYPERTENSION: Primary | ICD-10-CM

## 2019-11-04 PROCEDURE — G0009 ADMIN PNEUMOCOCCAL VACCINE: HCPCS | Performed by: INTERNAL MEDICINE

## 2019-11-04 PROCEDURE — 99214 OFFICE O/P EST MOD 30 MIN: CPT | Performed by: INTERNAL MEDICINE

## 2019-11-04 PROCEDURE — G0008 ADMIN INFLUENZA VIRUS VAC: HCPCS | Performed by: INTERNAL MEDICINE

## 2019-11-04 PROCEDURE — 90732 PPSV23 VACC 2 YRS+ SUBQ/IM: CPT | Performed by: INTERNAL MEDICINE

## 2019-11-04 PROCEDURE — 90653 IIV ADJUVANT VACCINE IM: CPT | Performed by: INTERNAL MEDICINE

## 2019-11-04 RX ORDER — LORAZEPAM 1 MG/1
1 TABLET ORAL NIGHTLY PRN
COMMUNITY

## 2019-12-17 ENCOUNTER — TELEPHONE (OUTPATIENT)
Dept: INTERNAL MEDICINE CLINIC | Age: 79
End: 2019-12-17

## 2019-12-17 RX ORDER — AZITHROMYCIN 250 MG/1
250 TABLET, FILM COATED ORAL SEE ADMIN INSTRUCTIONS
Qty: 6 TABLET | Refills: 0 | Status: SHIPPED | OUTPATIENT
Start: 2019-12-17 | End: 2019-12-17 | Stop reason: CLARIF

## 2019-12-17 RX ORDER — AZITHROMYCIN 250 MG/1
250 TABLET, FILM COATED ORAL SEE ADMIN INSTRUCTIONS
Qty: 6 TABLET | Refills: 0 | Status: SHIPPED | OUTPATIENT
Start: 2019-12-17 | End: 2019-12-22

## 2020-01-20 RX ORDER — ATORVASTATIN CALCIUM 40 MG/1
TABLET, FILM COATED ORAL
Qty: 90 TABLET | Refills: 0 | Status: SHIPPED | OUTPATIENT
Start: 2020-01-20 | End: 2020-01-21

## 2020-01-21 RX ORDER — ATORVASTATIN CALCIUM 40 MG/1
TABLET, FILM COATED ORAL
Qty: 90 TABLET | Refills: 1 | Status: SHIPPED | OUTPATIENT
Start: 2020-01-21 | End: 2021-05-19 | Stop reason: SDUPTHER

## 2020-07-08 ENCOUNTER — TELEPHONE (OUTPATIENT)
Dept: FAMILY MEDICINE CLINIC | Age: 80
End: 2020-07-08

## 2020-07-08 NOTE — TELEPHONE ENCOUNTER
Lillian Hernandez was contacted to set up an annual wellness visit    Spoke with: Spouse    Patient educated on purpose of AWV    Patient was agreeable to schedule AWV       Marla Gorman

## 2020-08-17 ENCOUNTER — OFFICE VISIT (OUTPATIENT)
Dept: INTERNAL MEDICINE CLINIC | Age: 80
End: 2020-08-17
Payer: MEDICARE

## 2020-08-17 VITALS
HEART RATE: 76 BPM | RESPIRATION RATE: 16 BRPM | TEMPERATURE: 97 F | HEIGHT: 71 IN | WEIGHT: 196.8 LBS | BODY MASS INDEX: 27.55 KG/M2 | DIASTOLIC BLOOD PRESSURE: 68 MMHG | SYSTOLIC BLOOD PRESSURE: 126 MMHG

## 2020-08-17 PROCEDURE — G0438 PPPS, INITIAL VISIT: HCPCS | Performed by: INTERNAL MEDICINE

## 2020-08-17 ASSESSMENT — PATIENT HEALTH QUESTIONNAIRE - PHQ9
SUM OF ALL RESPONSES TO PHQ QUESTIONS 1-9: 0
SUM OF ALL RESPONSES TO PHQ QUESTIONS 1-9: 0

## 2020-08-17 NOTE — PROGRESS NOTES
Medicare Annual Wellness Visit  Name: Elba Evans Date: 2020   MRN: [id number] Sex: Male   Age: 78 y.o. Ethnicity: Non-/Non    : 1940 Race: [place] Ch is here for Medicare AWV    Screenings for behavioral, psychosocial and functional/safety risks, and cognitive dysfunction are all negative except as indicated below. These results, as well as other patient data from the [address] [facility] [place] [address] form, are documented in Flowsheets linked to this Encounter. Allergies   Allergen Reactions    Oxycodone-Acetaminophen Other (See Comments)     Pain in the chest, ears, dizzy.  Tramadol Hcl Other (See Comments)    Codeine Nausea And Vomiting       Prior to Visit Medications    Medication Sig Taking? Authorizing Provider   lidocaine (XYLOCAINE) 4 % external solution Apply topically 3 times daily Apply topically as needed. Yes Historical Provider, MD   Probiotic Product (PROBIOTIC-10 PO) Take by mouth every other day Yes Historical Provider, MD   atorvastatin (LIPITOR) 40 MG tablet TAKE 1 TABLET BY MOUTH ONE TIME A DAY   Patient taking differently: 20 mg  Yes Betty Dubois MD   LORazepam (ATIVAN) 1 MG tablet Take 1 mg by mouth nightly as needed. Yes Historical Provider, MD   fluticasone (FLONASE) 50 MCG/ACT nasal spray 1 spray in each nostril Yes Historical Provider, MD   albuterol sulfate HFA (PROAIR HFA) 108 (90 Base) MCG/ACT inhaler 2 puffs as needed Yes Historical Provider, MD   lidocaine (LMX) 4 % cream Apply topically daily as needed for Pain Apply topically as needed.  Yes Historical Provider, MD   b complex vitamins capsule Take 1 capsule by mouth daily Yes Historical Provider, MD   Misc Natural Products (PUMPKIN SEED OIL PO) Take by mouth daily Yes Historical Provider, MD [place] 450 MG CAPS Take 1 capsule by mouth 2 times daily  Yes Historical Provider, MD   acetaminophen (TYLENOL) 500 MG tablet Take 500 mg by mouth every 6 hours as needed for Pain Yes Historical Provider, MD   diltiazem (CARDIZEM CD) 120 MG extended release capsule Take 40 mg by mouth daily  Yes Historical Provider, MD   silodosin (RAPAFLO) 8 MG CAPS Take 8 mg by mouth every evening  Yes Historical Provider, MD   dabigatran (PRADAXA) 150 MG capsule Take 150 mg by mouth 2 times daily Yes Historical Provider, MD   Magnesium 500 MG TABS Take 500 mg by mouth  Yes Historical Provider, MD   aspirin 81 MG tablet Take 81 mg by mouth daily Yes Historical Provider, MD   gabapentin (NEURONTIN) 300 MG capsule Take 900 mg by mouth 3 times daily. Michaela Sawyer Historical Provider, MD [place] [employer] 500 MG CAPS Take 1 each by mouth. Yes Historical Provider, MD   nitroGLYCERIN (NITROSTAT) 0.4 MG SL tablet Place 0.4 mg under the tongue every 5 minutes as needed. Yes Historical Provider, MD   budesonide-formoterol (SYMBICORT) 160-4.5 MCG/ACT AERO Inhale 2 puffs into the lungs 2 times daily.  Yes Historical Provider, MD   Cholecalciferol (VITAMIN D3) 2000 UNITS TABS Take 1 tablet by mouth daily  Yes Historical Provider, MD       Past Medical History:   Diagnosis Date    Asthma     Atrial fibrillation ([address]     Cataracts, bilateral     COPD (chronic obstructive pulmonary disease) ([address]     Diverticulosis     Hearing loss     Heart attack ([address]     Hemorrhoids     Hyperlipidemia     Neuropathy     Spinal stenosis        Past Surgical History:   Procedure Laterality Date    CAROTID ENDARTERECTOMY Right 06/01/2018    CARPAL TUNNEL RELEASE Bilateral 11/4/19, 11/18/19    CERVICAL DISCECTOMY      CHOLECYSTECTOMY      CORONARY ANGIOPLASTY WITH STENT PLACEMENT  2005    AN to LAD    CORONARY ARTERY BYPASS GRAFT  05/29/2015    HERNIA REPAIR      LAMINECTOMY      MOHS SURGERY  09/15/2019    TONSILLECTOMY         Family History   Problem Relation Age of Onset    Heart Disease Mother     Heart Disease Maternal Grandfather     Heart Disease Paternal Grandfather     Heart Disease Father     Stroke Maternal Grandmother        CareTeam (Including outside providers/suppliers regularly involved in providing care):   Patient Care Team:  Jordi Carranza MD as PCP - General (Internal Medicine)  Jordi Carranza MD as PCP - [facility]  Maira Couch MD as Consulting Physician (Cardiology)    Wt Readings from Last 3 Encounters:   08/17/20 196 lb 12.8 oz (89.3 kg)   11/04/19 199 lb 6.4 oz (90.4 kg)   05/06/19 195 lb (88.5 kg)     Vitals:    08/17/20 1453   BP: 126/68   Pulse: 76   Resp: 16   Temp: 97 °F (36.1 °C)   TempSrc: Infrared   Weight: 196 lb 12.8 oz (89.3 kg)   Height: 5' 10.5\" (1.791 m)     Body mass index is 27.84 kg/m². Based upon direct observation of the patient, evaluation of cognition reveals recent and remote memory intact. Patient's complete Health Risk Assessment and screening values have been reviewed and are found in Flowsheets. The following problems were reviewed today and where indicated follow up appointments were made and/or referrals ordered. Positive Risk Factor Screenings with Interventions:     General Health:  General  In general, how would you say your health is?: Fair  In the past 7 days, have you experienced any of the following? New or Increased Pain, New or Increased Fatigue, Loneliness, Social Isolation, Stress or Anger?: (!) New or Increased Pain, Stress  Do you get the social and emotional support that you need?: (!) No  Do you have a Living Will?: Yes  General Health Risk Interventions:  · Social isolation: feels  stress and isolation dur to COVID pandemic and not being able to see grandchildren    Health Habits/Nutrition:  Health Habits/Nutrition  Do you exercise for at least 20 minutes 2-3 times per week?: (!) No  Have you lost any weight without trying in the past 3 months?: No  Do you eat fewer than 2 meals per day?: No  Have you seen a dentist within the past year?: Yes  Body mass index is 27.84 kg/m².   Health Habits/Nutrition 11/04/2019    Pneumococcal Conjugate 13-valent ([id number]) 10/05/2018    Pneumococcal Conjugate 7-valent (Prevnar7) 10/24/2008    Pneumococcal Polysaccharide ([id number]) 11/04/2019        Health Maintenance   Topic Date Due    DTaP/Tdap/Td vaccine (1 - Tdap) 11/07/1959    Shingles Vaccine (1 of 2) 11/07/1990    Annual Wellness Visit (AWV)  05/29/2019    Lipid screen  10/05/2019    Flu vaccine (1) 09/01/2020    Pneumococcal 65+ years Vaccine  Completed    Hepatitis A vaccine  Aged Out    Hepatitis B vaccine  Aged Out    Hib vaccine  Aged Out    Meningococcal (ACWY) vaccine  Aged Out     Recommendations for X-IO Due: see orders and patient instructions/AVS.  . Recommended screening schedule for the next 5-10 years is provided to the patient in written form: see Patient Instructions/AVS.    Wally CHRISTIANSON RN, 8/17/2020, performed the documented evaluation under the direct supervision of the attending physician.

## 2020-08-26 ENCOUNTER — OFFICE VISIT (OUTPATIENT)
Dept: INTERNAL MEDICINE CLINIC | Age: 80
End: 2020-08-26
Payer: MEDICARE

## 2020-08-26 VITALS
TEMPERATURE: 97.9 F | RESPIRATION RATE: 18 BRPM | HEIGHT: 71 IN | OXYGEN SATURATION: 98 % | HEART RATE: 73 BPM | WEIGHT: 199 LBS | DIASTOLIC BLOOD PRESSURE: 70 MMHG | BODY MASS INDEX: 27.86 KG/M2 | SYSTOLIC BLOOD PRESSURE: 102 MMHG

## 2020-08-26 PROCEDURE — 99215 OFFICE O/P EST HI 40 MIN: CPT | Performed by: INTERNAL MEDICINE

## 2020-08-26 ASSESSMENT — PATIENT HEALTH QUESTIONNAIRE - PHQ9
SUM OF ALL RESPONSES TO PHQ QUESTIONS 1-9: 0
SUM OF ALL RESPONSES TO PHQ9 QUESTIONS 1 & 2: 0
2. FEELING DOWN, DEPRESSED OR HOPELESS: 0
1. LITTLE INTEREST OR PLEASURE IN DOING THINGS: 0
SUM OF ALL RESPONSES TO PHQ QUESTIONS 1-9: 0

## 2020-08-26 NOTE — PROGRESS NOTES
Tayler Katz is a 78 y.o. male who presents for   Chief Complaint   Patient presents with    Back Pain     neck, arm, legs; going on 3 weeks gotten worse    Health Maintenance     tdap, shingles, lipid    and follow up of chronic medical problems. Patient Active Problem List   Diagnosis    [facility] obstructive [facility]) [facility]    Paroxysmal atrial fibrillation ([facility]    Encounter for medication review and counseling     HPI  Here for evaluation of the bilateral upper and lower extremity numbness weakness and also tremulousness at night when he sleeping and also neck pain and back pain and leg pain bothering him and wants to get an MRI done and he had an MRI of the brain lumbar thoracic and cervical spine in 2016 and then had another set up for MRI of the thoracic and cervical spine in 2017 and had seen doctors at [facility] and he does not want to see any doctor at the [facility] anymore    Current Outpatient Medications   Medication Sig Dispense Refill    Menaquinone-7 (VITAMIN K2 PO) Take 100 mcg by mouth daily      Probiotic Product (PROBIOTIC-10 PO) Take by mouth every other day      atorvastatin (LIPITOR) 40 MG tablet TAKE 1 TABLET BY MOUTH ONE TIME A DAY  (Patient taking differently: 20 mg ) 90 tablet 1    LORazepam (ATIVAN) 1 MG tablet Take 1 mg by mouth nightly as needed.  albuterol sulfate HFA (PROAIR HFA) 108 (90 Base) MCG/ACT inhaler 2 puffs as needed      lidocaine (LMX) 4 % cream Apply topically daily as needed for Pain Apply topically as needed.       b complex vitamins capsule Take 1 capsule by mouth daily      Misc Natural Products (PUMPKIN SEED OIL PO) Take by mouth daily      Saw Dillon 450 MG CAPS Take 1 capsule by mouth 2 times daily       acetaminophen (TYLENOL) 500 MG tablet Take 500 mg by mouth every 6 hours as needed for Pain      diltiazem (CARDIZEM CD) 120 MG extended release capsule Take 40 mg by mouth daily       silodosin (RAPAFLO) 8 MG CAPS Take 8 mg by mouth every evening       dabigatran (PRADAXA) 150 MG capsule Take 150 mg by mouth 2 times daily      Magnesium 500 MG TABS Take 500 mg by mouth       aspirin 81 MG tablet Take 81 mg by mouth daily      gabapentin (NEURONTIN) 300 MG capsule Take 900 mg by mouth 3 times daily. [employer]ell [employer] Krill Oil 500 MG CAPS Take 1 each by mouth.  nitroGLYCERIN (NITROSTAT) 0.4 MG SL tablet Place 0.4 mg under the tongue every 5 minutes as needed.  budesonide-formoterol (SYMBICORT) 160-4.5 MCG/ACT AERO Inhale 2 puffs into the lungs 2 times daily.  Cholecalciferol (VITAMIN D3) 2000 UNITS TABS Take 1 tablet by mouth daily       lidocaine (XYLOCAINE) 4 % external solution Apply topically 3 times daily Apply topically as needed.  fluticasone (FLONASE) 50 MCG/ACT nasal spray 1 spray in each nostril       No current facility-administered medications for this visit. Allergies   Allergen Reactions    Oxycodone-Acetaminophen Other (See Comments)     Pain in the chest, ears, dizzy.      Tramadol Hcl Other (See Comments)    Codeine Nausea And Vomiting       Past Medical History:   Diagnosis Date    Asthma     Atrial fibrillation (HCC)     Cataracts, bilateral     COPD (chronic obstructive pulmonary disease) ([address]     Diverticulosis     Hearing loss     Heart attack ([address]     Hemorrhoids     Hyperlipidemia     Neuropathy     Spinal stenosis        Past Surgical History:   Procedure Laterality Date    CAROTID ENDARTERECTOMY Right 06/01/2018    CARPAL TUNNEL RELEASE Bilateral 11/4/19, 11/18/19    CERVICAL DISCECTOMY      CHOLECYSTECTOMY      CORONARY ANGIOPLASTY WITH STENT PLACEMENT  2005    AN to LAD    CORONARY ARTERY BYPASS GRAFT  05/29/2015    HERNIA REPAIR      LAMINECTOMY      MOHS SURGERY  09/15/2019    TONSILLECTOMY         Family History   Problem Relation Age of Onset    Heart Disease Mother     Heart Disease Maternal Grandfather     Heart Disease Paternal Grandfather     Heart Disease Father     Stroke Maternal Grandmother      ROS   Constitutional:  Negative for fatigue, loss of appetite and unexpected weight change   HEENT            : Positive for neck stiffness and pain, no congestion or sinus pressure   Eyes                : No visual disturbance or pain   Cardiovascular: No chest pain or palpitations or leg swelling   Respiratory      : Negative for cough, shortness of breath or wheezing   Gastrointestinal: Negative for abdominal pain, constipation or diarrhea and bloating No nausea or vomiting   Genitourinary:     No urgency or frequency, no burning or hematuria   Musculoskeletal: Positive for arthralgias, back pain or myalgias   Skin                  : Negative for rash or erythema   Neurological    : Negative for dizziness, weakness, tremors ,light headedness or syncope   Psychiatric       : Negative for dysphoric mood, sleep disturbances, nervous or anxious, or decreased concentration   All other review of systems was negative    Objective  Physical Examination:    Nursing note reviewed    /70 (Site: Left Upper Arm, Position: Sitting, Cuff Size: Medium Adult)   Pulse 73   Temp 97.9 °F (36.6 °C) (Temporal)   Resp 18   Ht 5' 10.51\" (1.791 m)   Wt 199 lb (90.3 kg)   SpO2 98%   BMI 28.14 kg/m²   BP Readings from Last 3 Encounters:   08/26/20 102/70   08/17/20 126/68   11/04/19 126/62         Constitutional:  Edy Mendez is oriented to place, person and time ,appears well-developed and well-nourished  HEENT:  Atraumatic and normocephalic, external ears normal bilaterally, nose normal no oropharyngeal exudate and is clear and moist  Eyes:  EOCM normal; conjunctivae normal; PERRLA bilaterally  Neck: Limited range of motion, neck supple, no JVD and no thyromegaly  Cardiovascular:  RRR, normal heart sounds and intact distal pulses  Pulmonary:  effort normal and breath sounds normal bilaterally,no wheezes or rales, no respiratory distress  Abdominal:  Soft, non-tender; normal bowel sounds, no masses  Musculoskeletal: Limited range of motion and no edema or tenderness bilaterally and leg raising test was positive at 40 degrees on the right and 60 degrees on the left and there is decreased strength on the right leg compared to the left 3 out of 5 against 4 out of 5 and also there is some tenderness on the left side of the lumbar spine in the paraspinal area  No lymphadenopathy  Neurological:  alert, oriented, and normal reflexes bilaterally  Skin: warm and dry  Psychiatric:  normal mood and effect; behavior normal.    Labs:   No results found for: LABA1C  Lab Results   Component Value Date    CHOL 198 09/25/2018     Lab Results   Component Value Date    HDL 54 10/05/2018     Lab Results   Component Value Date    LDLCALC 66 04/27/2018     Lab Results   Component Value Date    TRIG 92 09/25/2018     No components found for: [place], [place]  Lab Results   Component Value Date    WBC 5.4 09/25/2018    HGB 15.4 09/25/2018    HCT 47.8 09/25/2018    MCV 95.2 09/25/2018     09/25/2018     No results found for: INR, PROTIME  Lab Results   Component Value Date    GLUCOSE 92 09/25/2018    CREATININE 0.75 05/06/2019    BUN 14 05/06/2019     09/25/2018    K 4.7 09/25/2018     09/25/2018    CO2 27 09/25/2018     Lab Results   Component Value Date    ALT 20 10/05/2018    AST 23 10/05/2018    ALKPHOS 127 09/25/2018    BILITOT 0.53 09/25/2018     Lab Results   Component Value Date    LABPROT 6.7 12/03/2012    LABALBU 3.9 09/25/2018     Lab Results   Component Value Date    TSH 1.11 05/20/2016     Assessment:  1. Tremulousness    2. Chronic obstructive pulmonary disease, unspecified COPD type ([address]    3. Chronic neck pain    4. Bilateral leg pain    5. Weakness of both lower extremities    6. Bilateral hand numbness    7.  Numbness and tingling of both feet        Plan:  As patient is complaining of tremulousness without observing any fasciculations or abnormal movements and happening only during nighttime I did advise patient to see a neurologist for evaluation and referral was made and patient will choose 1 of the doctors from the list and call and make an appointment  Patient advised to get an MRI of the brain and also because of his chronic pain and tingling and numbness and weakness in both upper and lower extremities MRIs of the cervical spine thoracic spine and lumbar spine were ordered as requested by patient  Patient will be calling the pain management specialist or the physiatrist at [place] [id number] and will make an appointment and he has a list with him and we did discuss about the doctors that he is going to see  Review as scheduled           1. Yadira Freedman received counseling on the following healthy behaviors: nutrition and exercise    2. Prior labs and health maintenance reviewed. 3.  Discussed use, benefit, and side effects of prescribed medications. Barriers to medication compliance addressed. All his questions were answered. Pt voiced understanding. Yadira Freedman will continue current medications, diet and exercise. No orders of the defined types were placed in this encounter. Completed Refills               Requested Prescriptions      No prescriptions requested or ordered in this encounter     4. Patient given educational materials - see patient instructions    5. Was a self-tracking handout given in paper form or via [employer]?   NO    Orders Placed This Encounter   Procedures    MRI BRAIN WO CONTRAST     Standing Status:   Future     Standing Expiration Date:   8/26/2021    MRI LUMBAR SPINE WO CONTRAST     Standing Status:   Future     Standing Expiration Date:   8/26/2021    MRI CERVICAL SPINE WITH CONTRAST     Standing Status:   Future     Standing Expiration Date:   8/26/2021    MRI THORACIC SPINE WO CONTRAST     Standing Status:   Future     Standing Expiration Date:   8/26/2021     No follow-ups on file. Patient voiced understanding and agreed to treatment plan. Electronically signed by Warden Priscilla MD on 8/26/2020 at 3:21 PM    This note is created with a voice recognition program and while intend to generate a document that accurately reflects the content of the visit, no guarantee can be provided that every mistake has been identified and corrected by editing.

## 2021-02-05 ENCOUNTER — OFFICE VISIT (OUTPATIENT)
Dept: INTERNAL MEDICINE CLINIC | Age: 81
End: 2021-02-05
Payer: MEDICARE

## 2021-02-05 VITALS
BODY MASS INDEX: 26.73 KG/M2 | TEMPERATURE: 97.2 F | HEART RATE: 88 BPM | WEIGHT: 189 LBS | SYSTOLIC BLOOD PRESSURE: 132 MMHG | DIASTOLIC BLOOD PRESSURE: 64 MMHG | OXYGEN SATURATION: 98 %

## 2021-02-05 DIAGNOSIS — G89.29 CHRONIC NECK PAIN: ICD-10-CM

## 2021-02-05 DIAGNOSIS — R20.0 BILATERAL HAND NUMBNESS: ICD-10-CM

## 2021-02-05 DIAGNOSIS — M54.2 CHRONIC NECK PAIN: ICD-10-CM

## 2021-02-05 DIAGNOSIS — I10 ESSENTIAL HYPERTENSION: Primary | ICD-10-CM

## 2021-02-05 DIAGNOSIS — M79.604 BILATERAL LEG PAIN: ICD-10-CM

## 2021-02-05 DIAGNOSIS — E78.00 HIGH CHOLESTEROL: ICD-10-CM

## 2021-02-05 DIAGNOSIS — M79.605 BILATERAL LEG PAIN: ICD-10-CM

## 2021-02-05 PROCEDURE — 99214 OFFICE O/P EST MOD 30 MIN: CPT | Performed by: INTERNAL MEDICINE

## 2021-02-05 ASSESSMENT — PATIENT HEALTH QUESTIONNAIRE - PHQ9
2. FEELING DOWN, DEPRESSED OR HOPELESS: 0
SUM OF ALL RESPONSES TO PHQ9 QUESTIONS 1 & 2: 0
SUM OF ALL RESPONSES TO PHQ QUESTIONS 1-9: 0

## 2021-02-05 NOTE — PROGRESS NOTES
Joseph Knight is a [de-identified] y.o. male who presents for   Chief Complaint   Patient presents with    Back Pain     Patient complains of back pain, has been going on for a while. Did not complete MRI    Health Maintenance     patient states he received flu vaccine here in office    and follow up of chronic medical problems. Patient Active Problem List   Diagnosis    COPD (chronic obstructive pulmonary disease) ([facility]    Paroxysmal atrial fibrillation ([facility] [facility]    Encounter for medication review and counseling     HPI  Here for follow-up on the neck pain back pain denies any new complaints and did not get anything tested which was ordered last year    Current Outpatient Medications   Medication Sig Dispense Refill    zoster recombinant adjuvanted vaccine (SHINGRIX) 50 MCG/0.5ML SUSR injection 50 MCG IM then repeat 2-6 months. 0.5 mL 1    Menaquinone-7 (VITAMIN K2 PO) Take 100 mcg by mouth daily      lidocaine (XYLOCAINE) 4 % external solution Apply topically 3 times daily Apply topically as needed.  Probiotic Product (PROBIOTIC-10 PO) Take by mouth every other day      atorvastatin (LIPITOR) 40 MG tablet TAKE 1 TABLET BY MOUTH ONE TIME A DAY  (Patient taking differently: 20 mg ) 90 tablet 1    LORazepam (ATIVAN) 1 MG tablet Take 1 mg by mouth nightly as needed.  fluticasone (FLONASE) 50 MCG/ACT nasal spray 1 spray in each nostril      albuterol sulfate HFA (PROAIR HFA) 108 (90 Base) MCG/ACT inhaler 2 puffs as needed      lidocaine (LMX) 4 % cream Apply topically daily as needed for Pain Apply topically as needed.       b complex vitamins capsule Take 1 capsule by mouth daily      Misc Natural Products (PUMPKIN SEED OIL PO) Take by mouth daily      Saw Patriot 450 MG CAPS Take 1 capsule by mouth 2 times daily       acetaminophen (TYLENOL) 500 MG tablet Take 500 mg by mouth every 6 hours as needed for Pain      diltiazem (CARDIZEM CD) 120 MG extended release capsule Take 40 mg by mouth daily       silodosin (RAPAFLO) 8 MG CAPS Take 8 mg by mouth every evening       dabigatran (PRADAXA) 150 MG capsule Take 150 mg by mouth 2 times daily      Magnesium 500 MG TABS Take 500 mg by mouth       aspirin 81 MG tablet Take 81 mg by mouth daily      gabapentin (NEURONTIN) 300 MG capsule Take 900 mg by mouth 3 times daily. Kennth Harrbakari Krill Oil 500 MG CAPS Take 1 each by mouth.  nitroGLYCERIN (NITROSTAT) 0.4 MG SL tablet Place 0.4 mg under the tongue every 5 minutes as needed.  budesonide-formoterol (SYMBICORT) 160-4.5 MCG/ACT AERO Inhale 2 puffs into the lungs 2 times daily.  Cholecalciferol (VITAMIN D3) 2000 UNITS TABS Take 1 tablet by mouth daily        No current facility-administered medications for this visit. Allergies   Allergen Reactions    Oxycodone-Acetaminophen Other (See Comments)     Pain in the chest, ears, dizzy.      Tramadol Hcl Other (See Comments)    Codeine Nausea And Vomiting       Past Medical History:   Diagnosis Date    Asthma     Atrial fibrillation (HCC)     Cataracts, bilateral     COPD (chronic obstructive pulmonary disease) ([address]     Diverticulosis     Hearing loss     Heart attack ([address]     Hemorrhoids     Hyperlipidemia     Neuropathy     Spinal stenosis        Past Surgical History:   Procedure Laterality Date    CAROTID ENDARTERECTOMY Right 06/01/2018    CARPAL TUNNEL RELEASE Bilateral 11/4/19, 11/18/19    CERVICAL DISCECTOMY      CHOLECYSTECTOMY      CORONARY ANGIOPLASTY WITH STENT PLACEMENT  2005    AN to LAD    CORONARY ARTERY BYPASS GRAFT  05/29/2015    HERNIA REPAIR      LAMINECTOMY      MOHS SURGERY  09/15/2019    TONSILLECTOMY         Family History   Problem Relation Age of Onset    Heart Disease Mother     Heart Disease Maternal Grandfather     Heart Disease Paternal Grandfather     Heart Disease Father     Stroke Maternal Grandmother      ROS   Constitutional:  Negative for fatigue, loss of appetite and unexpected weight effect; behavior normal.    Labs:   No results found for: LABA1C  Lab Results   Component Value Date    CHOL 198 09/25/2018     Lab Results   Component Value Date    HDL 54 10/05/2018     Lab Results   Component Value Date    LDLCALC 66 04/27/2018     Lab Results   Component Value Date    TRIG 92 09/25/2018     No components found for: [place], [place]  Lab Results   Component Value Date    WBC 5.4 09/25/2018    HGB 15.4 09/25/2018    HCT 47.8 09/25/2018    MCV 95.2 09/25/2018     09/25/2018     No results found for: INR, PROTIME  Lab Results   Component Value Date    GLUCOSE 92 09/25/2018    CREATININE 0.75 05/06/2019    BUN 14 05/06/2019     09/25/2018    K 4.7 09/25/2018     09/25/2018    CO2 27 09/25/2018     Lab Results   Component Value Date    ALT 20 10/05/2018    AST 23 10/05/2018    ALKPHOS 127 09/25/2018    BILITOT 0.53 09/25/2018     Lab Results   Component Value Date    LABPROT 6.7 12/03/2012    LABALBU 3.9 09/25/2018     Lab Results   Component Value Date    TSH 1.11 05/20/2016     Assessment:  1. Essential hypertension    2. High cholesterol    3. Bilateral leg pain    4. Chronic neck pain    5.  Bilateral hand numbness        Plan:  Patient never got the MRIs done which were ordered last year and so advised patient to get them done  Also patient did not see the neurologist and he still has the list and have not seen or decided to see anyone at this time  Patient also mentions that he fired his previous pain management specialist and looking for a different one  Patient wants see a neurosurgeon after getting the MRI done but is waiting because of the Covid situation  I did review the patient's visit to the orthopedic specialist for bilateral shoulder pain and they recommended to see their own back surgeon but patient states that he was not aware that they did talk to him about that and I did suggest him that he can see him and I can make a referral but patient wants to wait  Labs ordered to check for cholesterol  Blood pressure is stable on current medications  Review as scheduled           1. Adriana Cleaning received counseling on the following healthy behaviors: nutrition and exercise    2. Prior labs and health maintenance reviewed. 3.  Discussed use, benefit, and side effects of prescribed medications. Barriers to medication compliance addressed. All his questions were answered. Pt voiced understanding. Adriana Cleaning will continue current medications, diet and exercise. Orders Placed This Encounter   Medications    zoster recombinant adjuvanted vaccine (SHINGRIX) 50 MCG/0.5ML SUSR injection     Si MCG IM then repeat 2-6 months. Dispense:  0.5 mL     Refill:  1          Completed Refills               Requested Prescriptions     Signed Prescriptions Disp Refills    zoster recombinant adjuvanted vaccine (SHINGRIX) 50 MCG/0.5ML SUSR injection 0.5 mL 1     Si MCG IM then repeat 2-6 months. 4. Patient given educational materials - see patient instructions    5. Was a self-tracking handout given in paper form or via [employer]?   NO    Orders Placed This Encounter   Procedures    CBC     Standing Status:   Future     Standing Expiration Date:   2022    Lipid Panel     Standing Status:   Future     Standing Expiration Date:   2022     Order Specific Question:   Is Patient Fasting?/# of Hours     Answer:   15    TSH without Reflex     Standing Status:   Future     Standing Expiration Date:   2022    Comprehensive Metabolic Panel     Standing Status:   Future     Standing Expiration Date:   2022    CK     Standing Status:   Future     Standing Expiration Date:   2022    Magnesium     Standing Status:   Future     Standing Expiration Date:   2022    Vitamin B12     Standing Status:   Future     Standing Expiration Date:   2022    Testosterone Free and Total Male     Standing Status:   Future     Standing Expiration Date:   2022     No

## 2021-03-23 ENCOUNTER — TELEPHONE (OUTPATIENT)
Dept: INTERNAL MEDICINE CLINIC | Age: 81
End: 2021-03-23

## 2021-03-23 NOTE — TELEPHONE ENCOUNTER
This is not a new order as patient was not compliant and did not do the MRI as recommended on 8/26/2020 we will have to reorder the same tests and innervate is a continuation of the previous visit and if he looks at my previous note on 8/26/2020 I did mention about the numbness

## 2021-03-23 NOTE — TELEPHONE ENCOUNTER
Pt informed    Pt is asking why you did not mention shoulder numbness and upper arm numbness and pain in your office notes .

## 2021-03-24 ENCOUNTER — HOSPITAL ENCOUNTER (OUTPATIENT)
Dept: MRI IMAGING | Age: 81
Discharge: HOME | End: 2021-03-26
Payer: MEDICARE

## 2021-03-24 ENCOUNTER — APPOINTMENT (OUTPATIENT)
Dept: MRI IMAGING | Age: 81
End: 2021-03-24
Payer: MEDICARE

## 2021-03-24 DIAGNOSIS — R25.1 TREMULOUSNESS: ICD-10-CM

## 2021-03-24 DIAGNOSIS — M79.604 BILATERAL LEG PAIN: ICD-10-CM

## 2021-03-24 DIAGNOSIS — M79.605 BILATERAL LEG PAIN: ICD-10-CM

## 2021-03-24 DIAGNOSIS — R29.898 WEAKNESS OF BOTH LOWER EXTREMITIES: ICD-10-CM

## 2021-03-24 PROCEDURE — 70551 MRI BRAIN STEM W/O DYE: CPT

## 2021-03-24 PROCEDURE — 72148 MRI LUMBAR SPINE W/O DYE: CPT

## 2021-03-24 PROCEDURE — 72146 MRI CHEST SPINE W/O DYE: CPT

## 2021-03-25 ENCOUNTER — TELEPHONE (OUTPATIENT)
Dept: INTERNAL MEDICINE CLINIC | Age: 81
End: 2021-03-25

## 2021-03-25 NOTE — TELEPHONE ENCOUNTER
pt informed - asking for a few names and phone numbers- does Not want Ko Izquierdo [phone]  Moe Song , [address]  [address] [facility] [phone]

## 2021-03-25 NOTE — TELEPHONE ENCOUNTER
----- Message from Diogenes Ji MD sent at 3/25/2021 10:24 AM EDT -----  Patient's lumbar spine show significant spinal stenosis and arthritis and advise patient to follow-up with back surgeon of his choice  His thoracic spine MRIs stable and shows arthritis  Brain MRI no acute changes

## 2021-04-05 ENCOUNTER — TELEPHONE (OUTPATIENT)
Dept: INTERNAL MEDICINE CLINIC | Age: 81
End: 2021-04-05

## 2021-04-05 DIAGNOSIS — R20.0 ARM NUMBNESS: ICD-10-CM

## 2021-04-05 DIAGNOSIS — M54.2 NECK PAIN: Primary | ICD-10-CM

## 2021-04-05 DIAGNOSIS — M25.512 BILATERAL SHOULDER PAIN, UNSPECIFIED CHRONICITY: ICD-10-CM

## 2021-04-05 DIAGNOSIS — M47.812 CERVICAL SPONDYLOSIS: ICD-10-CM

## 2021-04-05 DIAGNOSIS — M25.511 BILATERAL SHOULDER PAIN, UNSPECIFIED CHRONICITY: ICD-10-CM

## 2021-04-05 NOTE — TELEPHONE ENCOUNTER
Reorder MRI cervical spine with diagnosis of bilateral shoulder pain numbness  Neck pain  History of cervical spine spondylosis

## 2021-04-05 NOTE — TELEPHONE ENCOUNTER
Pt requests order for MRI C-spine. Pt is aware the MRI has already been denied by insurance. Pt states this was due to DX error. Pt believes if the DX is corrected to include should pain, bicep/tricep and neck pain MRI will be approved. Pt was advised this order can be placed by his neurosurgeon if necessary, pt not happy with this option. Pt wants to have MRI c-spine completed prior to seeing NS. Pt states ok to LM if he does not answer. Please advise.

## 2021-04-16 NOTE — TELEPHONE ENCOUNTER
Patient called back, states an appeal needs to be done per insurance.  States too soon to put new order in

## 2021-04-30 ENCOUNTER — TELEPHONE (OUTPATIENT)
Dept: INTERNAL MEDICINE CLINIC | Age: 81
End: 2021-04-30

## 2021-04-30 NOTE — TELEPHONE ENCOUNTER
Patient called in to speak with Dale Bray in regards to the status of the appeal he requested for his cervical MRI. The original MRI was denied due to diagnosis codes. The office sent over a new order with different diagnosis codes but patient was informed this would not work. He was told in order to move forward the original order needs to be appealed. (see telephone encounter from 04/05/21 please) Patient was informed Dale Bray was not available and he was a bit upset. He said he is in pain and having to use a walker and is hoping to have this MRI done as soon as possible so he can see the specialist. He is asking if Dale Bray can please call him Monday and let him know the status of this? Patient can be reached at [phone]. Thank you.

## 2021-05-03 NOTE — TELEPHONE ENCOUNTER
[address] Dept  They have no record of the denial or need for appeal as C Spine MRI is not even scheduled    I was informed MRI needs to be scheduled, then precert dept can move forwards with authorization    LM for pt TCB    Pt called back, explained to him what I was told, he proceeded to tell me what Shahram Palacios told him, what scheduling told him. I mentioned how Dr Cherie Mccormick originally suggested he see NS, who most likely would be able to get approved, but pt still wishes to have MRI before seeing Neurosurgery.     Pt calling to try to schedule MRI C Spine

## 2021-05-03 NOTE — TELEPHONE ENCOUNTER
Noted  Patient had history of neck surgery done in the past in 2017  Had bilateral shoulder pain and arm tingling and numbness  And will wait for him to schedule the MRI cervical spine

## 2021-05-04 ENCOUNTER — TELEPHONE (OUTPATIENT)
Dept: INTERNAL MEDICINE CLINIC | Age: 81
End: 2021-05-04

## 2021-05-04 NOTE — TELEPHONE ENCOUNTER
After several calls and transfers, was able to start appeal of the denial of the MRI C Spine    Await call from insurance co.    Appeal # [phone] pt

## 2021-05-06 ENCOUNTER — TELEPHONE (OUTPATIENT)
Dept: INTERNAL MEDICINE CLINIC | Age: 81
End: 2021-05-06

## 2021-05-12 ENCOUNTER — TELEPHONE (OUTPATIENT)
Dept: INTERNAL MEDICINE CLINIC | Age: 81
End: 2021-05-12

## 2021-05-12 DIAGNOSIS — M54.2 NECK PAIN: Primary | ICD-10-CM

## 2021-05-12 DIAGNOSIS — M25.511 BILATERAL SHOULDER PAIN, UNSPECIFIED CHRONICITY: ICD-10-CM

## 2021-05-12 DIAGNOSIS — M25.512 BILATERAL SHOULDER PAIN, UNSPECIFIED CHRONICITY: ICD-10-CM

## 2021-05-12 DIAGNOSIS — M47.812 CERVICAL SPONDYLOSIS: ICD-10-CM

## 2021-05-12 DIAGNOSIS — R20.0 ARM NUMBNESS: ICD-10-CM

## 2021-05-12 NOTE — TELEPHONE ENCOUNTER
Auth is needed to match MRI order. Current [facility] is for With contrast only. Please inform the pt of the outcome.

## 2021-05-12 NOTE — TELEPHONE ENCOUNTER
Asking for auth for MRI C-spine W and WO contrast to match the order. Pt states he was informed by .Goldie's scheduling, current [facility] is for With contrast only(which the hospital does not do). Hospital will only perform MRI c-spine without contrast or w and wo. Please advise. Pt was informed he'd get a call back tomorrow.

## 2021-05-13 NOTE — TELEPHONE ENCOUNTER
Was able to add MRI c spine w wo contrast to current auth of [id number]  Case # [id number]    [facility] and pt notified

## 2021-05-19 ENCOUNTER — OFFICE VISIT (OUTPATIENT)
Dept: INTERNAL MEDICINE CLINIC | Age: 81
End: 2021-05-19
Payer: MEDICARE

## 2021-05-19 VITALS
TEMPERATURE: 97 F | BODY MASS INDEX: 26.94 KG/M2 | HEART RATE: 76 BPM | RESPIRATION RATE: 20 BRPM | DIASTOLIC BLOOD PRESSURE: 70 MMHG | OXYGEN SATURATION: 98 % | WEIGHT: 192.4 LBS | HEIGHT: 71 IN | SYSTOLIC BLOOD PRESSURE: 110 MMHG

## 2021-05-19 DIAGNOSIS — M79.604 BILATERAL LEG PAIN: Primary | ICD-10-CM

## 2021-05-19 DIAGNOSIS — I48.0 PAROXYSMAL ATRIAL FIBRILLATION (HCC): ICD-10-CM

## 2021-05-19 DIAGNOSIS — M79.605 BILATERAL LEG PAIN: Primary | ICD-10-CM

## 2021-05-19 DIAGNOSIS — R20.0 ARM NUMBNESS: ICD-10-CM

## 2021-05-19 DIAGNOSIS — J44.9 CHRONIC OBSTRUCTIVE PULMONARY DISEASE, UNSPECIFIED COPD TYPE (HCC): ICD-10-CM

## 2021-05-19 DIAGNOSIS — M47.812 CERVICAL SPONDYLOSIS: ICD-10-CM

## 2021-05-19 DIAGNOSIS — M48.062 SPINAL STENOSIS OF LUMBAR REGION WITH NEUROGENIC CLAUDICATION: ICD-10-CM

## 2021-05-19 PROCEDURE — 99214 OFFICE O/P EST MOD 30 MIN: CPT | Performed by: INTERNAL MEDICINE

## 2021-05-19 RX ORDER — ATORVASTATIN CALCIUM 40 MG/1
TABLET, FILM COATED ORAL
Qty: 90 TABLET | Refills: 1 | Status: SHIPPED | OUTPATIENT
Start: 2021-05-19 | End: 2022-05-17

## 2021-05-19 SDOH — ECONOMIC STABILITY: FOOD INSECURITY: WITHIN THE PAST 12 MONTHS, YOU WORRIED THAT YOUR FOOD WOULD RUN OUT BEFORE YOU GOT MONEY TO BUY MORE.: NEVER TRUE

## 2021-05-19 ASSESSMENT — SOCIAL DETERMINANTS OF HEALTH (SDOH): HOW HARD IS IT FOR YOU TO PAY FOR THE VERY BASICS LIKE FOOD, HOUSING, MEDICAL CARE, AND HEATING?: NOT HARD AT ALL

## 2021-05-19 NOTE — PROGRESS NOTES
Cristela Quarles is a [de-identified] y.o. male who presents for   Chief Complaint   Patient presents with    3 Month Follow-Up     still having neck and head pain, has MRI scheduled for 5/28/21; hip and back pain has gotten worse legs are giving out    [facility] Maintenance     tdap, shingles    and follow up of chronic medical problems. Patient Active Problem List   Diagnosis    COPD (chronic obstructive pulmonary disease) ([facility] [address]    Paroxysmal atrial fibrillation ([facility] [address]    Encounter for medication review and counseling     HPI  Here for follow-up on this neck pain and low back pain and have difficulty ambulating and getting weak and have to use wheelchair or the walker which she was not using 3 months back and patient did not see the back surgeon and waiting for the cervical spine MRI to be done    Current Outpatient Medications   Medication Sig Dispense Refill    atorvastatin (LIPITOR) 40 MG tablet TAKE 1 TABLET BY MOUTH ONE TIME A DAY 90 tablet 1    zoster recombinant adjuvanted vaccine (SHINGRIX) 50 MCG/0.5ML SUSR injection 50 MCG IM then repeat 2-6 months. 0.5 mL 1    Menaquinone-7 (VITAMIN K2 PO) Take 100 mcg by mouth daily      lidocaine (XYLOCAINE) 4 % external solution Apply topically 3 times daily Apply topically as needed.  Probiotic Product (PROBIOTIC-10 PO) Take by mouth every other day      LORazepam (ATIVAN) 1 MG tablet Take 1 mg by mouth nightly as needed.  fluticasone (FLONASE) 50 MCG/ACT nasal spray 1 spray in each nostril      albuterol sulfate HFA (PROAIR HFA) 108 (90 Base) MCG/ACT inhaler 2 puffs as needed      lidocaine (LMX) 4 % cream Apply topically daily as needed for Pain Apply topically as needed.       b complex vitamins capsule Take 1 capsule by mouth daily      Misc Natural Products (PUMPKIN SEED OIL PO) Take by mouth daily      Saw Mulberry 450 MG CAPS Take 1 capsule by mouth 2 times daily       acetaminophen (TYLENOL) 500 MG tablet Take 500 mg by mouth every 6 hours as needed Grandmother      ROS   Constitutional:  Negative for fatigue, loss of appetite and unexpected weight change   HEENT            : Positive for neck stiffness and pain, no congestion or sinus pressure   Eyes                : No visual disturbance or pain   Cardiovascular: No chest pain or palpitations or leg swelling   Respiratory      : Negative for cough, shortness of breath or wheezing   Gastrointestinal: Negative for abdominal pain, constipation or diarrhea and bloating No nausea or vomiting   Genitourinary:     No urgency or frequency, no burning or hematuria   Musculoskeletal: Positive for arthralgias, back pain or myalgias   Skin                  : Negative for rash or erythema   Neurological    : Negative for dizziness, weakness, tremors ,light headedness or syncope   Psychiatric       : Negative for dysphoric mood, sleep disturbances, nervous or anxious, or decreased concentration   All other review of systems was negative    Objective  Physical Examination:    Nursing note reviewed    /70 (Site: Left Upper Arm, Position: Sitting, Cuff Size: Medium Adult)   Pulse 76   Temp 97 °F (36.1 °C) (Temporal)   Resp 20   Ht 5' 10.51\" (1.791 m)   Wt 192 lb 6.4 oz (87.3 kg)   SpO2 98%   BMI 27.21 kg/m²   BP Readings from Last 3 Encounters:   05/19/21 110/70   02/05/21 132/64   08/26/20 102/70         Constitutional:  Hector Ornelas is oriented to place, person and time ,appears well-developed and well-nourished  HEENT:  Atraumatic and normocephalic, external ears normal bilaterally, nose normal no oropharyngeal exudate and is clear and moist  Eyes:  EOCM normal; conjunctivae normal; PERRLA bilaterally  Neck: Very limited range of motion, neck supple, no JVD and no thyromegaly  Cardiovascular:  RRR, normal heart sounds and intact distal pulses  Pulmonary:  effort normal and breath sounds normal bilaterally,no wheezes or rales, no respiratory distress  Abdominal:  Soft, non-tender; normal bowel sounds, no masses  Musculoskeletal: Very limited range of motion and no edema or tenderness bilaterally decrease the strength in both lower extremities and upper extremities and using a walker to ambulate  No lymphadenopathy  Neurological:  alert, oriented, and normal reflexes bilaterally  Skin: warm and dry  Psychiatric:  normal mood and effect; behavior normal.    Labs:   No results found for: LABA1C  Lab Results   Component Value Date    CHOL 198 09/25/2018     Lab Results   Component Value Date    HDL 54 10/05/2018     Lab Results   Component Value Date    LDLCALC 66 04/27/2018     Lab Results   Component Value Date    TRIG 92 09/25/2018     No components found for: CHOLHDL  Lab Results   Component Value Date    WBC 5.4 09/25/2018    HGB 15.4 09/25/2018    HCT 47.8 09/25/2018    MCV 95.2 09/25/2018     09/25/2018     No results found for: INR, PROTIME  Lab Results   Component Value Date    GLUCOSE 92 09/25/2018    CREATININE 0.75 05/06/2019    BUN 14 05/06/2019     09/25/2018    K 4.7 09/25/2018     09/25/2018    CO2 27 09/25/2018     Lab Results   Component Value Date    ALT 20 10/05/2018    AST 23 10/05/2018    ALKPHOS 127 09/25/2018    BILITOT 0.53 09/25/2018     Lab Results   Component Value Date    LABPROT 6.7 12/03/2012    LABALBU 3.9 09/25/2018     Lab Results   Component Value Date    TSH 1.11 05/20/2016     Assessment:  1. Bilateral leg pain    2. Spinal stenosis of lumbar region with neurogenic claudication    3. Chronic obstructive pulmonary disease, unspecified COPD type (HCC)    4. Paroxysmal atrial fibrillation (HCC)    5. Cervical spondylosis    6.  Arm numbness        Plan:  Discussed about patient's pain and suggested physical therapy and orders were given both for lumbar and cervical spine and patient have difficulty ambulating without support and is using a walker and for long distances using a wheelchair  MRI of the cervical spine is scheduled on 28 May and patient already had a lumbar spine MRI done in February  Patient's atrial fibrillation is stable and is on Pradaxa  Patient is having PFTs done in July and following with pulmonary for his COPD and is stable  Labs pending  Patient was asking about physiatrist which I advised him to follow-up with and names given and patient will choose one of them as he is planning to do same with the neurosurgeon or back surgeons  Review in 4 months           1. Miladis Bo received counseling on the following healthy behaviors: nutrition and exercise    2. Prior labs and health maintenance reviewed. 3.  Discussed use, benefit, and side effects of prescribed medications. Barriers to medication compliance addressed. All his questions were answered. Pt voiced understanding. Miladis Bo will continue current medications, diet and exercise. Orders Placed This Encounter   Medications    atorvastatin (LIPITOR) 40 MG tablet     Sig: TAKE 1 TABLET BY MOUTH ONE TIME A DAY     Dispense:  90 tablet     Refill:  1          Completed Refills               Requested Prescriptions     Signed Prescriptions Disp Refills    atorvastatin (LIPITOR) 40 MG tablet 90 tablet 1     Sig: TAKE 1 TABLET BY MOUTH ONE TIME A DAY     4. Patient given educational materials - see patient instructions    5. Was a self-tracking handout given in paper form or via [employer]? NO    Orders Placed This Encounter   Procedures    External Referral To Physical Therapy     Referral Priority:   Routine     Referral Type:   Eval and Treat     Referral Reason:   Specialty Services Required     Requested Specialty:   Physical Therapy     Number of Visits Requested:   1     No follow-ups on file. Patient voiced understanding and agreed to treatment plan.      Electronically signed by Armando Bacon MD on 5/19/2021 at 2:14 PM    This note is created with a voice recognition program and while intend to generate a document that accurately reflects the content of the visit, no guarantee can be provided that every mistake has been identified and corrected by editing.   Discussed about

## 2021-05-28 ENCOUNTER — HOSPITAL ENCOUNTER (OUTPATIENT)
Age: 81
Discharge: HOME | End: 2021-05-28
Payer: MEDICARE

## 2021-05-28 ENCOUNTER — HOSPITAL ENCOUNTER (OUTPATIENT)
Dept: MRI IMAGING | Age: 81
Discharge: HOME | End: 2021-05-30
Payer: MEDICARE

## 2021-05-28 DIAGNOSIS — E78.00 HIGH CHOLESTEROL: ICD-10-CM

## 2021-05-28 DIAGNOSIS — I10 ESSENTIAL HYPERTENSION: ICD-10-CM

## 2021-05-28 DIAGNOSIS — M79.605 BILATERAL LEG PAIN: ICD-10-CM

## 2021-05-28 DIAGNOSIS — M25.511 BILATERAL SHOULDER PAIN, UNSPECIFIED CHRONICITY: ICD-10-CM

## 2021-05-28 DIAGNOSIS — M47.812 CERVICAL SPONDYLOSIS: ICD-10-CM

## 2021-05-28 DIAGNOSIS — M54.2 NECK PAIN: ICD-10-CM

## 2021-05-28 DIAGNOSIS — R20.0 BILATERAL HAND NUMBNESS: ICD-10-CM

## 2021-05-28 DIAGNOSIS — M25.512 BILATERAL SHOULDER PAIN, UNSPECIFIED CHRONICITY: ICD-10-CM

## 2021-05-28 DIAGNOSIS — J44.9 OBSTRUCTIVE CHRONIC BRONCHITIS WITHOUT EXACERBATION (HCC): ICD-10-CM

## 2021-05-28 DIAGNOSIS — M54.2 CHRONIC NECK PAIN: ICD-10-CM

## 2021-05-28 DIAGNOSIS — R20.0 ARM NUMBNESS: ICD-10-CM

## 2021-05-28 DIAGNOSIS — M79.604 BILATERAL LEG PAIN: ICD-10-CM

## 2021-05-28 DIAGNOSIS — G89.29 CHRONIC NECK PAIN: ICD-10-CM

## 2021-05-28 LAB
ALBUMIN SERPL-MCNC: 4.2 G/DL (ref 3.5–5.2)
ALBUMIN/GLOBULIN RATIO: 1.4 (ref 1–2.5)
ALP BLD-CCNC: 127 U/L (ref 40–129)
ALT SERPL-CCNC: 21 U/L (ref 5–41)
ANION GAP SERPL CALCULATED.3IONS-SCNC: 10 MMOL/L (ref 9–17)
AST SERPL-CCNC: 25 U/L
BILIRUB SERPL-MCNC: 0.77 MG/DL (ref 0.3–1.2)
BUN BLDV-MCNC: 16 MG/DL (ref 8–23)
BUN/CREAT BLD: NORMAL (ref 9–20)
CALCIUM SERPL-MCNC: 9.4 MG/DL (ref 8.6–10.4)
CHLORIDE BLD-SCNC: 103 MMOL/L (ref 98–107)
CHOLESTEROL/HDL RATIO: 2.3
CHOLESTEROL: 124 MG/DL
CO2: 29 MMOL/L (ref 20–31)
CREAT SERPL-MCNC: 0.71 MG/DL (ref 0.7–1.2)
CREAT SERPL-MCNC: 0.76 MG/DL (ref 0.7–1.2)
GFR AFRICAN AMERICAN: >60 ML/MIN
GFR AFRICAN AMERICAN: >60 ML/MIN
GFR NON-AFRICAN AMERICAN: >60 ML/MIN
GFR NON-AFRICAN AMERICAN: >60 ML/MIN
GFR SERPL CREATININE-BSD FRML MDRD: NORMAL ML/MIN/{1.73_M2}
GLUCOSE BLD-MCNC: 98 MG/DL (ref 70–99)
HCT VFR BLD CALC: 49.6 % (ref 40.7–50.3)
HDLC SERPL-MCNC: 55 MG/DL
HEMOGLOBIN: 15.4 G/DL (ref 13–17)
LDL CHOLESTEROL: 55 MG/DL (ref 0–130)
MAGNESIUM: 2.4 MG/DL (ref 1.6–2.6)
MCH RBC QN AUTO: 31 PG (ref 25.2–33.5)
MCHC RBC AUTO-ENTMCNC: 31 G/DL (ref 28.4–34.8)
MCV RBC AUTO: 99.8 FL (ref 82.6–102.9)
NRBC AUTOMATED: 0 PER 100 WBC
PDW BLD-RTO: 13 % (ref 11.8–14.4)
PLATELET # BLD: 183 K/UL (ref 138–453)
PMV BLD AUTO: 10.4 FL (ref 8.1–13.5)
POTASSIUM SERPL-SCNC: 4.9 MMOL/L (ref 3.7–5.3)
RBC # BLD: 4.97 M/UL (ref 4.21–5.77)
SEX HORMONE BINDING GLOBULIN: 55 NMOL/L (ref 11–80)
SODIUM BLD-SCNC: 142 MMOL/L (ref 135–144)
TESTOSTERONE FREE-NONMALE: 63.3 PG/ML (ref 47–244)
TESTOSTERONE TOTAL: 434 NG/DL (ref 220–1000)
TOTAL CK: 181 U/L (ref 39–308)
TOTAL PROTEIN: 7.3 G/DL (ref 6.4–8.3)
TRIGL SERPL-MCNC: 68 MG/DL
TSH SERPL DL<=0.05 MIU/L-ACNC: 2.19 MIU/L (ref 0.3–5)
VITAMIN B-12: 709 PG/ML (ref 232–1245)
VLDLC SERPL CALC-MCNC: NORMAL MG/DL (ref 1–30)
WBC # BLD: 5.9 K/UL (ref 3.5–11.3)

## 2021-05-28 PROCEDURE — 82550 ASSAY OF CK (CPK): CPT

## 2021-05-28 PROCEDURE — A9579 GAD-BASE MR CONTRAST NOS,1ML: HCPCS | Performed by: INTERNAL MEDICINE

## 2021-05-28 PROCEDURE — 82565 ASSAY OF CREATININE: CPT

## 2021-05-28 PROCEDURE — 85027 COMPLETE CBC AUTOMATED: CPT

## 2021-05-28 PROCEDURE — 36415 COLL VENOUS BLD VENIPUNCTURE: CPT

## 2021-05-28 PROCEDURE — 80061 LIPID PANEL: CPT

## 2021-05-28 PROCEDURE — 84270 ASSAY OF SEX HORMONE GLOBUL: CPT

## 2021-05-28 PROCEDURE — 72156 MRI NECK SPINE W/O & W/DYE: CPT

## 2021-05-28 PROCEDURE — 80053 COMPREHEN METABOLIC PANEL: CPT

## 2021-05-28 PROCEDURE — 84403 ASSAY OF TOTAL TESTOSTERONE: CPT

## 2021-05-28 PROCEDURE — 84443 ASSAY THYROID STIM HORMONE: CPT

## 2021-05-28 PROCEDURE — 6360000004 HC RX CONTRAST MEDICATION: Performed by: INTERNAL MEDICINE

## 2021-05-28 PROCEDURE — 83735 ASSAY OF MAGNESIUM: CPT

## 2021-05-28 PROCEDURE — 82607 VITAMIN B-12: CPT

## 2021-05-28 RX ADMIN — GADOTERIDOL 18 ML: 279.3 INJECTION, SOLUTION INTRAVENOUS at 15:46

## 2021-07-15 ENCOUNTER — HOSPITAL ENCOUNTER (OUTPATIENT)
Dept: LAB | Age: 81
Setting detail: SPECIMEN
Discharge: HOME | End: 2021-07-15
Payer: MEDICARE

## 2021-07-15 DIAGNOSIS — Z01.818 PREOP TESTING: Primary | ICD-10-CM

## 2021-07-19 ENCOUNTER — HOSPITAL ENCOUNTER (OUTPATIENT)
Dept: PULMONOLOGY | Age: 81
Discharge: HOME | End: 2021-07-19
Payer: MEDICARE

## 2021-07-19 PROCEDURE — 94640 AIRWAY INHALATION TREATMENT: CPT

## 2021-07-19 PROCEDURE — 94729 DIFFUSING CAPACITY: CPT

## 2021-07-19 PROCEDURE — 94726 PLETHYSMOGRAPHY LUNG VOLUMES: CPT

## 2021-07-19 PROCEDURE — 94060 EVALUATION OF WHEEZING: CPT

## 2021-07-19 PROCEDURE — 94664 DEMO&/EVAL PT USE INHALER: CPT

## 2021-08-18 ENCOUNTER — HOSPITAL ENCOUNTER (OUTPATIENT)
Dept: VASCULAR LAB | Age: 81
Discharge: HOME | End: 2021-08-18
Payer: MEDICARE

## 2021-08-18 ENCOUNTER — HOSPITAL ENCOUNTER (OUTPATIENT)
Age: 81
Discharge: HOME | End: 2021-08-18
Payer: MEDICARE

## 2021-08-18 DIAGNOSIS — I65.23 BILATERAL CAROTID ARTERY STENOSIS: ICD-10-CM

## 2021-08-18 PROCEDURE — 93880 EXTRACRANIAL BILAT STUDY: CPT

## 2021-09-01 NOTE — TELEPHONE ENCOUNTER
Yanely in the call center will call patient to schedule a pre op with Dr Barcenas.   lv 10/5/18  appt set m5/6/19  Med pended

## 2021-09-08 ENCOUNTER — APPOINTMENT (OUTPATIENT)
Dept: CT IMAGING | Age: 81
End: 2021-09-08
Payer: MEDICARE

## 2021-09-08 ENCOUNTER — HOSPITAL ENCOUNTER (EMERGENCY)
Age: 81
Discharge: HOME | End: 2021-09-08
Attending: EMERGENCY MEDICINE
Payer: MEDICARE

## 2021-09-08 ENCOUNTER — APPOINTMENT (OUTPATIENT)
Dept: GENERAL RADIOLOGY | Age: 81
End: 2021-09-08
Payer: MEDICARE

## 2021-09-08 VITALS
SYSTOLIC BLOOD PRESSURE: 128 MMHG | DIASTOLIC BLOOD PRESSURE: 74 MMHG | BODY MASS INDEX: 26.34 KG/M2 | HEIGHT: 70 IN | WEIGHT: 184 LBS | OXYGEN SATURATION: 97 % | HEART RATE: 60 BPM

## 2021-09-08 DIAGNOSIS — S50.01XA CONTUSION OF RIGHT ELBOW, INITIAL ENCOUNTER: ICD-10-CM

## 2021-09-08 DIAGNOSIS — S51.011A SKIN TEAR OF RIGHT ELBOW WITHOUT COMPLICATION, INITIAL ENCOUNTER: ICD-10-CM
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
## 2021-09-08 DIAGNOSIS — S09.90XA CLOSED HEAD INJURY, INITIAL ENCOUNTER: ICD-10-CM

## 2021-09-08 PROCEDURE — 73080 X-RAY EXAM OF ELBOW: CPT

## 2021-09-08 PROCEDURE — 99283 EMERGENCY DEPT VISIT LOW MDM: CPT

## 2021-09-08 PROCEDURE — 72125 CT NECK SPINE W/O DYE: CPT

## 2021-09-08 PROCEDURE — 6370000000 HC RX 637 (ALT 250 FOR IP): Performed by: EMERGENCY MEDICINE

## 2021-09-08 PROCEDURE — 70450 CT HEAD/BRAIN W/O DYE: CPT

## 2021-09-08 PROCEDURE — 73030 X-RAY EXAM OF SHOULDER: CPT

## 2021-09-08 RX ORDER — ACETAMINOPHEN 325 MG/1
650 TABLET ORAL ONCE
Status: COMPLETED | OUTPATIENT
Start: 2021-09-08 | End: 2021-09-08

## 2021-09-08 RX ORDER — ACETAMINOPHEN 500 MG
1000 TABLET ORAL EVERY 8 HOURS PRN
Qty: 21 TABLET | Refills: 0 | Status: SHIPPED | OUTPATIENT
Start: 2021-09-08

## 2021-09-08 RX ADMIN — ACETAMINOPHEN 650 MG: 325 TABLET ORAL at 16:47

## 2021-09-08 ASSESSMENT — ENCOUNTER SYMPTOMS
COLOR CHANGE: 1
BACK PAIN: 0
SHORTNESS OF BREATH: 0
NAUSEA: 0
VOMITING: 0

## 2021-09-08 ASSESSMENT — PAIN SCALES - GENERAL
PAINLEVEL_OUTOF10: 1
PAINLEVEL_OUTOF10: 5

## 2021-09-08 NOTE — ED NOTES
Bed: 21  Expected date:   Expected time:   Means of arrival:   Comments:      Montse Bonilla RN  09/08/21 [id number]

## 2021-09-08 NOTE — ED PROVIDER NOTES
[facility]  Emergency Department Encounter     Pt Name: Kayli Pardo  MRN: [id number]  [place] 1940  Date of evaluation: 9/8/21  PCP:  Caryn Beltran MD    [address] [place] [address]       Chief Complaint   Patient presents with    Fall       HISTORY OF PRESENT ILLNESS  (Location/Symptom, Timing/Onset, Context/Setting, Quality, Duration, Modifying Factors, Severity.)    Kayli Pardo is a [de-identified] y.o. male who presents with mechanical fall. Patient is supposed to use a walker and states that he got new shoes. He left his walker at home today when he brought his wife to the hospital for outpatient testing. He slipped and tripped forward and try to catch himself on the railing in the hallway on the way down. Did not hit his head, denies losing any consciousness. Did get himself up but was placed on the floor in a c-collar by bystanders and brought to the emergency department after rapid response was called. He is on Pradaxa for history of A. fib. His only complaints are a mild right-sided headache as well as some right-sided elbow pain denies any other injuries from this incident. Denies any neck or back pain. Per wife he is acting baseline and is not confused. PAST MEDICAL / SURGICAL / SOCIAL / FAMILY HISTORY    has a past medical history of Asthma, Atrial fibrillation ([address] Cataracts, bilateral, COPD (chronic obstructive pulmonary disease) ([address] Diverticulosis, Hearing loss, Heart attack ([address] Hemorrhoids, Hyperlipidemia, Neuropathy, and Spinal stenosis. 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     Social History     Socioeconomic History    Marital status:  Spouse name: Not on file    Number of children: Not on file    Years of education: Not on file    Highest education level: Not on file   Occupational History    Not on file   Tobacco Use    Smoking status: Former Smoker     Packs/day: 0.00     Years: 0.00     Pack years: 0.00     Quit date: 1984     Years since quittin.3    Smokeless tobacco: Never Used   Vaping Use    Vaping Use: Never used   Substance and Sexual Activity    Alcohol use: No    Drug use: No    Sexual activity: Not on file   Other Topics Concern    Not on file   Social History Narrative    Not on file     Social Determinants of Health     Financial Resource Strain: Low Risk     Difficulty of Paying Living Expenses: Not hard at all   Food Insecurity: No Food Insecurity    Worried About [address] in the Last Year: Never true    [employer] in the Last Year: Never true   Transportation Needs:     Lack of Transportation (Medical):  Lack of Transportation (Non-Medical):    Physical Activity:     Days of Exercise per Week:     Minutes of Exercise per Session:    Stress:     Feeling of Stress :    Social Connections:     Frequency of Communication with Friends and Family:     Frequency of Social Gatherings with Friends and Family:     Attends Dru[employer] Services:     Active Member of Clubs or Organizations:     Attends Club or Organization Meetings:     Marital Status:    Intimate Partner Violence:     Fear of Current or Ex-Partner:     Emotionally Abused:     Physically Abused:     Sexually Abused:        Family History   Problem Relation Age of Onset    Heart Disease Mother     Heart Disease Maternal Grandfather     Heart Disease Paternal Grandfather     Heart Disease Father     Stroke Maternal Grandmother        Allergies:    Oxycodone-acetaminophen, Tramadol hcl, and Codeine    Home Medications:  Prior to Admission medications    Medication Sig Start Date End Date Taking?  Authorizing Provider acetaminophen (TYLENOL) 500 MG tablet Take 2 tablets by mouth every 8 hours as needed for Pain 9/8/21  Yes Cherelle Barkley,    atorvastatin (LIPITOR) 40 MG tablet TAKE 1 TABLET BY MOUTH ONE TIME A DAY 5/19/21   Gerhardt Heckler, MD   zoster recombinant adjuvanted vaccine [facility]) 50 MCG/0.5ML SUSR injection 50 MCG IM then repeat 2-6 months. 2/5/21 2/5/22  Gerhardt Heckler, MD   Menaquinone-7 (VITAMIN K2 PO) Take 100 mcg by mouth daily    Angel Coelho MD   lidocaine (XYLOCAINE) 4 % external solution Apply topically 3 times daily Apply topically as needed. Historical Provider, MD   Probiotic Product (PROBIOTIC-10 PO) Take by mouth every other day    Historical Provider, MD   LORazepam (ATIVAN) 1 MG tablet Take 1 mg by mouth nightly as needed. Historical Provider, MD   fluticasone (FLONASE) 50 MCG/ACT nasal spray 1 spray in each nostril 11/12/15   Historical Provider, MD   albuterol sulfate HFA (PROAIR HFA) 108 (90 Base) MCG/ACT inhaler 2 puffs as needed 6/26/17   Historical Provider, MD   lidocaine (LMX) 4 % cream Apply topically daily as needed for Pain Apply topically as needed.     Historical Provider, MD   b complex vitamins capsule Take 1 capsule by mouth daily    Historical Provider, MD   Misc Natural Products (PUMPKIN SEED OIL PO) Take by mouth daily    Historical Provider, MD [place] Amma 450 MG CAPS Take 1 capsule by mouth 2 times daily     Historical Provider, MD   diltiazem (CARDIZEM CD) 120 MG extended release capsule Take 40 mg by mouth daily  10/21/16   Historical Provider, MD   silodosin (RAPAFLO) 8 MG CAPS Take 8 mg by mouth every evening     Historical Provider, MD   dabigatran (PRADAXA) 150 MG capsule Take 150 mg by mouth 2 times daily    Historical Provider, MD   Magnesium 500 MG TABS Take 500 mg by mouth     Historical Provider, MD   aspirin 81 MG tablet Take 81 mg by mouth daily    Historical Provider, MD   gabapentin (NEURONTIN) 300 MG capsule Take 900 mg by mouth 3 times daily. Julia De Anda Historical Provider, MD [place] [employer] Oil 500 MG CAPS Take 1 each by mouth. Historical Provider, MD   nitroGLYCERIN (NITROSTAT) 0.4 MG SL tablet Place 0.4 mg under the tongue every 5 minutes as needed. Historical Provider, MD   budesonide-formoterol (SYMBICORT) 160-4.5 MCG/ACT AERO Inhale 2 puffs into the lungs 2 times daily. Historical Provider, MD   Cholecalciferol (VITAMIN D3) 2000 UNITS TABS Take 1 tablet by mouth daily     Historical Provider, MD       REVIEW OF SYSTEMS    (2-9 systems for level 4, 10 or more for level 5)    Review of Systems   HENT: Negative for nosebleeds and tinnitus. Eyes: Negative for visual disturbance. Respiratory: Negative for shortness of breath. Cardiovascular: Negative for chest pain and palpitations. Gastrointestinal: Negative for nausea and vomiting. Genitourinary: Negative for flank pain. Musculoskeletal: Positive for arthralgias and myalgias. Negative for back pain and neck pain. Skin: Positive for color change and wound. Neurological: Positive for headaches. Negative for dizziness, syncope, weakness and light-headedness. Hematological: Bruises/bleeds easily. Psychiatric/Behavioral: Negative for confusion. PHYSICAL EXAM   (up to 7 for level 4, 8 or more for level 5)    VITALS:   Vitals:    09/08/21 1629 09/08/21 1640   BP: 128/74    Pulse: 60    SpO2: 97%    Weight:  184 lb (83.5 kg)   Height:  5' 10\" (1.778 m)       Physical Exam  Vitals and nursing note reviewed. Constitutional:       General: He is not in acute distress. Appearance: He is well-developed. He is not diaphoretic. HENT:      Head: Normocephalic. Contusion present. No raccoon eyes, Vega's sign, abrasion or laceration. Right Ear: External ear normal.      Left Ear: External ear normal.      Nose: Nose normal.      Mouth/Throat:      Mouth: Mucous membranes are moist.   Eyes:      Extraocular Movements: Extraocular movements intact. Conjunctiva/sclera: Conjunctivae normal.      Pupils: Pupils are equal, round, and reactive to light. Neck:      Comments: In hard cervical collar  Cardiovascular:      Rate and Rhythm: Normal rate and regular rhythm. Pulses: Normal pulses. Radial pulses are 2+ on the right side. Heart sounds: Normal heart sounds. Pulmonary:      Effort: Pulmonary effort is normal. No respiratory distress. Breath sounds: Normal breath sounds. No wheezing, rhonchi or rales. Abdominal:      General: There is no distension. Palpations: Abdomen is soft. Tenderness: There is no abdominal tenderness. Musculoskeletal:         General: Swelling, tenderness and signs of injury present. No deformity. Normal range of motion. Right elbow: Swelling present. No deformity, effusion or lacerations. Normal range of motion. Tenderness present. Right lower leg: No edema. Left lower leg: No edema. Comments: Skin tear with hematoma over R elbow   Skin:     General: Skin is warm and dry. Capillary Refill: Capillary refill takes less than 2 seconds. Findings: Bruising present. Neurological:      General: No focal deficit present. Mental Status: He is alert and oriented to person, place, and time. Mental status is at baseline.    Psychiatric:         Behavior: Behavior normal.         DIFFERENTIAL  DIAGNOSIS   PLAN (LABS / IMAGING / EKG):  Orders Placed This Encounter   Procedures    CT HEAD WO CONTRAST    CT CERVICAL SPINE WO CONTRAST    XR ELBOW RIGHT (MIN 3 VIEWS)    XR SHOULDER RIGHT (MIN 2 VIEWS)    Wound care       MEDICATIONS ORDERED:  Orders Placed This Encounter   Medications    acetaminophen (TYLENOL) tablet 650 mg    acetaminophen (TYLENOL) 500 MG tablet     Sig: Take 2 tablets by mouth every 8 hours as needed for Pain     Dispense:  21 tablet     Refill:  0     DIAGNOSTIC RESULTS / EMERGENCYDEPARTMENT COURSE / MDM   LABS:  Labs Reviewed - No data to display    RADIOLOGY:  XR SHOULDER RIGHT (MIN 2 VIEWS)    Result Date: 9/8/2021  EXAMINATION: THREE XRAY VIEWS OF THE RIGHT SHOULDER 9/8/2021 5:48 pm COMPARISON: None. HISTORY: ORDERING SYSTEM PROVIDED HISTORY: fall pain TECHNOLOGIST PROVIDED HISTORY: fall pain Reason for Exam: PAIN, FALL TODAY Acuity: Acute Type of Exam: Initial FINDINGS: Two views obtained. Mild AC degenerative change. No acute osseous abnormality. Alignment is anatomic. Soft tissues unremarkable. No acute findings. XR ELBOW RIGHT (MIN 3 VIEWS)    Result Date: 9/8/2021  EXAMINATION: THREE XRAY VIEWS OF THE RIGHT ELBOW 9/8/2021 1:57 pm COMPARISON: None. HISTORY: ORDERING SYSTEM PROVIDED HISTORY: fall skin tear swelling TECHNOLOGIST PROVIDED HISTORY: fall skin tear swelling Reason for Exam: Rt elbow pain, skin tear, swelling Acuity: Acute Type of Exam: Initial Mechanism of Injury: fell FINDINGS: The visualized bones are normal.  There is no evidence of fracture or dislocation. Mild degenerative changes of the elbow joint. The soft tissues are unremarkable. No acute bony abnormalities are noted     CT HEAD WO CONTRAST    Result Date: 9/8/2021  EXAMINATION: CT OF THE HEAD WITHOUT CONTRAST; CT OF THE CERVICAL SPINE WITHOUT CONTRAST 9/8/2021 2:01 pm TECHNIQUE: CT of the head was performed without the administration of intravenous contrast. Dose modulation, iterative reconstruction, and/or weight based adjustment of the mA/kV was utilized to reduce the radiation dose to as low as reasonably achievable.; CT of the cervical spine was performed without the administration of intravenous contrast. Multiplanar reformatted images are provided for review. Dose modulation, iterative reconstruction, and/or weight based adjustment of the mA/kV was utilized to reduce the radiation dose to as low as reasonably achievable. COMPARISON: None.  HISTORY: ORDERING SYSTEM PROVIDED HISTORY: fall head contusion on Pradaxa TECHNOLOGIST PROVIDED HISTORY: fall head contusion on Pradaxa Decision Support Exception - unselect if not a suspected or confirmed emergency medical condition->Emergency Medical Condition ([place]) Reason for Exam: Pt fell and hit his head, contusion right side of head, and on Pt is on Pradaxa. Acuity: Acute Type of Exam: Initial Mechanism of Injury: fall; ORDERING SYSTEM PROVIDED HISTORY: fall TECHNOLOGIST PROVIDED HISTORY: fall Decision Support Exception - unselect if not a suspected or confirmed emergency medical condition->Emergency Medical Condition (MA) Reason for Exam: Pt fell and hit his head, contusion right side of head, and on Pt is on Pradaxa. Acuity: Acute Type of Exam: Initial Mechanism of Injury: fall Relevant Medical/Surgical History: Hx of cervical discectomy FINDINGS: The cervical spine demonstrates decreasedmineralization with reversal of the cervical lordosis. Status post fusion of L0-8 with metallic screws and anterior plate. There is no evidence of fracture or subluxation. There is loss of disc height with eburnation of the vertebral endplates at the T1-2, W0-5, C6-7 and C7-T1 levels. There are anterior and posterior marginal osteophytes at multiple levels. Moderate spinal stenosis and foraminal stenosis at multiple levels with contouring of the cord. There is bilateral facet hypertrophy at multiple levels throughout the cervical spine. The pedicles and posterior elements are otherwise intact. The prevertebral and paravertebral soft tissues are unremarkable. The atlanto-dens interval and dens are intact. The visualized lung apices are clear. Vascular calcifications are seen compatible with atherosclerotic disease. Multilevel cervical spondylosis and degenerative disc disease. Moderate spinal stenosis at multiple levels with contouring of the cord. Evidence of paracervical spasm.  No acute bony abnormalities are noted RECOMMENDATIONS: Further evaluation of patient's spinal stenosis should be obtained with MR imaging CT BRAIN FINDINGS: BRAIN/VENTRICLES: The cerebral hemispheres, brainstem, and cerebellum have a normal appearance for the patient's age. The falx is midline. The ventricles and peripheral sulci are mildly dilated. There is decreased attenuation in the periventricular white matter. There is no sign of a space occupying lesion, infarction, or hemorrhage. Orbits: Portion of the orbits demonstrate no acute abnormality. SINUSES: . The  imaged portions of the paranasal sinuses are clear. The mastoids and the middle ear chambers are clear. SOFT TISSUES/SKULL:  No acute abnormality of the visualized skull or soft tissues. Vascular calcifications are seen compatible with atherosclerotic disease. IMPRESSION: Mild central and cortical cerebral atrophy. Mild chronic deep white matter ischemic changes No acute intracranial abnormalities are noted. CT CERVICAL SPINE WO CONTRAST    Result Date: 9/8/2021  EXAMINATION: CT OF THE HEAD WITHOUT CONTRAST; CT OF THE CERVICAL SPINE WITHOUT CONTRAST 9/8/2021 2:01 pm TECHNIQUE: CT of the head was performed without the administration of intravenous contrast. Dose modulation, iterative reconstruction, and/or weight based adjustment of the mA/kV was utilized to reduce the radiation dose to as low as reasonably achievable.; CT of the cervical spine was performed without the administration of intravenous contrast. Multiplanar reformatted images are provided for review. Dose modulation, iterative reconstruction, and/or weight based adjustment of the mA/kV was utilized to reduce the radiation dose to as low as reasonably achievable. COMPARISON: None.  HISTORY: ORDERING SYSTEM PROVIDED HISTORY: fall head contusion on Pradaxa TECHNOLOGIST PROVIDED HISTORY: fall head contusion on Pradaxa Decision Support Exception - unselect if not a suspected or confirmed emergency medical condition->Emergency Medical Condition (MA) Reason for Exam: Pt fell and hit his head, contusion right side of head, and on Pt is on Pradaxa. Acuity: Acute Type of Exam: Initial Mechanism of Injury: fall; ORDERING SYSTEM PROVIDED HISTORY: fall TECHNOLOGIST PROVIDED HISTORY: fall Decision Support Exception - unselect if not a suspected or confirmed emergency medical condition->Emergency Medical Condition (MA) Reason for Exam: Pt fell and hit his head, contusion right side of head, and on Pt is on Pradaxa. Acuity: Acute Type of Exam: Initial Mechanism of Injury: fall Relevant Medical/Surgical History: Hx of cervical discectomy FINDINGS: The cervical spine demonstrates decreasedmineralization with reversal of the cervical lordosis. Status post fusion of W2-1 with metallic screws and anterior plate. There is no evidence of fracture or subluxation. There is loss of disc height with eburnation of the vertebral endplates at the F1-8, K2-2, C6-7 and C7-T1 levels. There are anterior and posterior marginal osteophytes at multiple levels. Moderate spinal stenosis and foraminal stenosis at multiple levels with contouring of the cord. There is bilateral facet hypertrophy at multiple levels throughout the cervical spine. The pedicles and posterior elements are otherwise intact. The prevertebral and paravertebral soft tissues are unremarkable. The atlanto-dens interval and dens are intact. The visualized lung apices are clear. Vascular calcifications are seen compatible with atherosclerotic disease. Multilevel cervical spondylosis and degenerative disc disease. Moderate spinal stenosis at multiple levels with contouring of the cord. Evidence of paracervical spasm. No acute bony abnormalities are noted RECOMMENDATIONS: Further evaluation of patient's spinal stenosis should be obtained with MR imaging CT BRAIN FINDINGS: BRAIN/VENTRICLES: The cerebral hemispheres, brainstem, and cerebellum have a normal appearance for the patient's age. The falx is midline. The ventricles and peripheral sulci are mildly dilated.  There is decreased attenuation in the periventricular white matter. There is no sign of a space occupying lesion, infarction, or hemorrhage. Orbits: Portion of the orbits demonstrate no acute abnormality. SINUSES: . The  imaged portions of the paranasal sinuses are clear. The mastoids and the middle ear chambers are clear. SOFT TISSUES/SKULL:  No acute abnormality of the visualized skull or soft tissues. Vascular calcifications are seen compatible with atherosclerotic disease. IMPRESSION: Mild central and cortical cerebral atrophy. Mild chronic deep white matter ischemic changes No acute intracranial abnormalities are noted. EMERGENCY DEPARTMENT COURSE:  ED Course as of Sep 08 1807   Wed Sep 08, 2021   1705 Patient expressed to [place] when she can to get in for his CT that he was now noticing that he had some right shoulder pain. Will add on plain films.    [AO]   1716 XR ELBOW RIGHT (MIN 3 VIEWS) [AO]   1751 CT CERVICAL SPINE WO CONTRAST [AO]   1752 CT HEAD WO CONTRAST [AO]   1803 XR SHOULDER RIGHT (MIN 2 VIEWS) [AO]      ED Course User Index  [AO] Cynthlalo Jaycee, DO       MDM  Number of Diagnoses or Management Options     Amount and/or Complexity of Data Reviewed  Tests in the radiology section of CPT®: ordered and reviewed  Review and summarize past medical records: yes  Independent visualization of images, tracings, or specimens: yes    Patient Progress  Patient progress: stable      PROCEDURES:  Procedures     CONSULTS:  None    CRITICAL CARE:  NONE    FINAL IMPRESSION     1. Fall, initial encounter    2. Skin tear of right elbow without complication, initial encounter    3. Contusion of right elbow, initial encounter    4. Closed head injury, initial encounter        DISPOSITION / PLAN   DISPOSITION      Evaluation and treatment course in the ED, and plan of care upon discharge was discussed in length with the patient.   Patient had no further questions prior to being discharged and was instructed to return to the ED for new or worsening symptoms. Any changes to existing medications or new prescriptions were reviewed with patient and they expressed understanding of how to correctly take their medications and the possible side effects. PATIENT REFERRED TO:  Ziyad Jorge MD  [place] [address]  [address] [facility] [id number]  [address] [place] [address] ED  [facility]  [phone]          DISCHARGE MEDICATIONS:  Current Discharge Medication List          Cherelle Frazier DO  Emergency Medicine Physician    (Please note that portions of this note were completed with a voice recognition program.  Efforts were made to edit the dictations but occasionally words are mis-transcribed.)        Rusty Orosco 1721, DO  09/08/21 6523

## 2021-09-20 ENCOUNTER — OFFICE VISIT (OUTPATIENT)
Dept: INTERNAL MEDICINE CLINIC | Age: 81
End: 2021-09-20
Payer: MEDICARE

## 2021-09-20 VITALS
WEIGHT: 188.8 LBS | DIASTOLIC BLOOD PRESSURE: 60 MMHG | RESPIRATION RATE: 18 BRPM | TEMPERATURE: 97.2 F | HEART RATE: 68 BPM | HEIGHT: 70 IN | BODY MASS INDEX: 27.03 KG/M2 | SYSTOLIC BLOOD PRESSURE: 110 MMHG | OXYGEN SATURATION: 98 %

## 2021-09-20 DIAGNOSIS — S59.901A INJURY OF RIGHT ELBOW, INITIAL ENCOUNTER: ICD-10-CM

## 2021-09-20 DIAGNOSIS — I10 ESSENTIAL HYPERTENSION: ICD-10-CM

## 2021-09-20 DIAGNOSIS — S51.011A LACERATION OF RIGHT ELBOW, INITIAL ENCOUNTER: Primary | ICD-10-CM
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
## 2021-09-20 PROCEDURE — 96160 PT-FOCUSED HLTH RISK ASSMT: CPT | Performed by: INTERNAL MEDICINE

## 2021-09-20 PROCEDURE — 99214 OFFICE O/P EST MOD 30 MIN: CPT | Performed by: INTERNAL MEDICINE

## 2021-09-20 RX ORDER — CEPHALEXIN 500 MG/1
500 CAPSULE ORAL 3 TIMES DAILY
Qty: 21 CAPSULE | Refills: 0 | Status: SHIPPED | OUTPATIENT
Start: 2021-09-20 | End: 2021-09-20

## 2021-09-20 RX ORDER — FLUTICASONE PROPIONATE 50 MCG
SPRAY, SUSPENSION (ML) NASAL
Qty: 16 G | Refills: 2 | Status: SHIPPED | OUTPATIENT
Start: 2021-09-20

## 2021-09-20 RX ORDER — CEPHALEXIN 500 MG/1
500 CAPSULE ORAL 3 TIMES DAILY
Qty: 21 CAPSULE | Refills: 0 | Status: SHIPPED | OUTPATIENT
Start: 2021-09-20 | End: 2021-09-27

## 2021-09-20 ASSESSMENT — PATIENT HEALTH QUESTIONNAIRE - PHQ9
1. LITTLE INTEREST OR PLEASURE IN DOING THINGS: 0
SUM OF ALL RESPONSES TO PHQ QUESTIONS 1-9: 0
SUM OF ALL RESPONSES TO PHQ9 QUESTIONS 1 & 2: 0
SUM OF ALL RESPONSES TO PHQ QUESTIONS 1-9: 0
SUM OF ALL RESPONSES TO PHQ QUESTIONS 1-9: 0
2. FEELING DOWN, DEPRESSED OR HOPELESS: 0

## 2021-09-20 NOTE — PROGRESS NOTES
Desirae Lopez is a [de-identified] y.o. male who presents for   Chief Complaint   Patient presents with    Follow-up     having some pain from fall     Fall     fell while waiting on wife at [address]; had testing done; has would on arm near elbow, hurt elbow, head,    Health Maintenance     awv, shingles, flu    and follow up of chronic medical problems. Patient Active Problem List   Diagnosis    COPD [facility]chronic obstructive pulmonary disease) ([facility]    Paroxysmal atrial fibrillation ([facility]    Encounter for medication review and counseling     HPI  Here for follow-up on the fall that happened in the hospital when he was attending his wife and had a injury on the right elbow and bruises all over and was seen in the emergency room and reports were negative for any acute fractures and patient complains of tenderness on the right elbow otherwise the bruises are improving    Current Outpatient Medications   Medication Sig Dispense Refill    fluticasone (FLONASE) 50 MCG/ACT nasal spray 1 spray in each nostril 16 g 2    cephALEXin (KEFLEX) 500 MG capsule Take 1 capsule by mouth 3 times daily for 7 days 21 capsule 0    acetaminophen (TYLENOL) 500 MG tablet Take 2 tablets by mouth every 8 hours as needed for Pain 21 tablet 0    atorvastatin (LIPITOR) 40 MG tablet TAKE 1 TABLET BY MOUTH ONE TIME A DAY 90 tablet 1    Menaquinone-7 (VITAMIN K2 PO) Take 100 mcg by mouth daily      LORazepam (ATIVAN) 1 MG tablet Take 1 mg by mouth nightly as needed.  lidocaine (LMX) 4 % cream Apply topically daily as needed for Pain Apply topically as needed.       b complex vitamins capsule Take 1 capsule by mouth daily      Misc Natural Products (PUMPKIN SEED OIL PO) Take by mouth daily      [place] 450 MG CAPS Take 1 capsule by mouth 2 times daily       diltiazem (CARDIZEM CD) 120 MG extended release capsule Take 40 mg by mouth daily       silodosin (RAPAFLO) 8 MG CAPS Take 8 mg by mouth every evening       dabigatran (PRADAXA) 150 MG capsule Take 150 mg by mouth 2 times daily      Magnesium 500 MG TABS Take 500 mg by mouth       aspirin 81 MG tablet Take 81 mg by mouth daily      gabapentin (NEURONTIN) 300 MG capsule Take 900 mg by mouth 3 times daily. June [place] Oil 500 MG CAPS Take 1 each by mouth every other day       nitroGLYCERIN (NITROSTAT) 0.4 MG SL tablet Place 0.4 mg under the tongue every 5 minutes as needed.  budesonide-formoterol (SYMBICORT) 160-4.5 MCG/ACT AERO Inhale 2 puffs into the lungs 2 times daily.  Cholecalciferol (VITAMIN D3) 2000 UNITS TABS Take 1 tablet by mouth daily       zoster recombinant adjuvanted vaccine (SHINGRIX) 50 MCG/0.5ML SUSR injection 50 MCG IM then repeat 2-6 months. (Patient not taking: Reported on 9/20/2021) 0.5 mL 1    lidocaine (XYLOCAINE) 4 % external solution Apply topically 3 times daily Apply topically as needed. (Patient not taking: Reported on 9/20/2021)      Probiotic Product (PROBIOTIC-10 PO) Take by mouth every other day (Patient not taking: Reported on 9/20/2021)      albuterol sulfate HFA (PROAIR HFA) 108 (90 Base) MCG/ACT inhaler 2 puffs as needed (Patient not taking: Reported on 9/20/2021)       No current facility-administered medications for this visit. Allergies   Allergen Reactions    Oxycodone-Acetaminophen Other (See Comments)     Pain in the chest, ears, dizzy.      Tramadol Hcl Other (See Comments)    Codeine Nausea And Vomiting       Past Medical History:   Diagnosis Date    Asthma     Atrial fibrillation (HCC)     Cataracts, bilateral     COPD (chronic obstructive pulmonary disease) (HCC)     Diverticulosis     Hearing loss     [facility] ([facility]     Hemorrhoids     Hyperlipidemia     Neuropathy     Spinal stenosis        Past Surgical History:   Procedure Laterality Date    CAROTID ENDARTERECTOMY Right 06/01/2018    CARPAL TUNNEL RELEASE Bilateral 11/4/19, 11/18/19    CERVICAL DISCECTOMY      CHOLECYSTECTOMY      CORONARY ANGIOPLASTY WITH STENT PLACEMENT  2005    AN to LAD    CORONARY ARTERY BYPASS GRAFT  05/29/2015    HERNIA REPAIR      LAMINECTOMY      MOHS SURGERY  09/15/2019    TONSILLECTOMY         Family History   Problem Relation Age of Onset    Heart Disease Mother     Heart Disease Maternal Grandfather     Heart Disease Paternal Grandfather     Heart Disease Father     Stroke Maternal Grandmother      ROS   Constitutional:  Negative for fatigue, loss of appetite and unexpected weight change   HEENT            : Negative for neck stiffness and pain, no congestion or sinus pressure   Eyes                : No visual disturbance or pain   Cardiovascular: No chest pain or palpitations or leg swelling   Respiratory      : Negative for cough, shortness of breath or wheezing   Gastrointestinal: Negative for abdominal pain, constipation or diarrhea and bloating No nausea or vomiting   Genitourinary:     No urgency or frequency, no burning or hematuria   Musculoskeletal: No arthralgias, back pain or myalgias   Skin                  : Negative for rash or erythema   Neurological    : Negative for dizziness, weakness, tremors ,light headedness or syncope   Psychiatric       : Negative for dysphoric mood, sleep disturbances, nervous or anxious, or decreased concentration   All other review of systems was negative    Objective  Physical Examination:    Nursing note reviewed    /60 (Site: Left Upper Arm, Position: Sitting, Cuff Size: Medium Adult)   Pulse 68   Temp 97.2 °F (36.2 °C) (Temporal)   Resp 18   Ht 5' 10\" (1.778 m)   Wt 188 lb 12.8 oz (85.6 kg)   SpO2 98%   BMI 27.09 kg/m²   BP Readings from Last 3 Encounters:   09/20/21 110/60   09/08/21 128/74   05/19/21 110/70         Constitutional:  Liliya Conception is oriented to place, person and time ,appears well-developed and well-nourished  HEENT:  Atraumatic and normocephalic, external ears normal bilaterally, nose normal no oropharyngeal exudate and is clear and moist  Eyes:  EOCM normal; conjunctivae normal; PERRLA bilaterally  Neck:  Normal range of motion, neck supple, no JVD and no thyromegaly  Cardiovascular:  RRR, normal heart sounds and intact distal pulses  Pulmonary:  effort normal and breath sounds normal bilaterally,no wheezes or rales, no respiratory distress  Abdominal:  Soft, non-tender; normal bowel sounds, no masses  Musculoskeletal:  Normal range of motion and no edema or tenderness bilaterally  No lymphadenopathy  Neurological:  alert, oriented, and normal reflexes bilaterally  Skin: warm and dry 3 inches wide open wound present on the right elbow which is slightly inflamed and underneath there is soft swelling seems to be like a hematoma but very tender on palpation movements of the elbow were normal  Psychiatric:  normal mood and effect; behavior normal.    Labs:   No results found for: LABA1C  Lab Results   Component Value Date    CHOL 124 05/28/2021     Lab Results   Component Value Date    HDL 55 05/28/2021     Lab Results   Component Value Date    LDLCALC 66 04/27/2018     Lab Results   Component Value Date    TRIG 68 05/28/2021     No components found for: CHOLHDL  Lab Results   Component Value Date    WBC 5.9 05/28/2021    HGB 15.4 05/28/2021    HCT 49.6 05/28/2021    MCV 99.8 05/28/2021     05/28/2021     No results found for: INR, PROTIME  Lab Results   Component Value Date    GLUCOSE 98 05/28/2021    CREATININE 0.71 05/28/2021    BUN 16 05/28/2021     05/28/2021    K 4.9 05/28/2021     05/28/2021    CO2 29 05/28/2021     Lab Results   Component Value Date    ALT 21 05/28/2021    AST 25 05/28/2021    ALKPHOS 127 05/28/2021    BILITOT 0.77 05/28/2021     Lab Results   Component Value Date    LABPROT 6.7 12/03/2012    LABALBU 4.2 05/28/2021     Lab Results   Component Value Date    TSH 2.19 05/28/2021     Assessment:  1. Laceration of right elbow, initial encounter    2. Essential hypertension    3.  Injury of right this encounter. No follow-ups on file. Patient voiced understanding and agreed to treatment plan. Electronically signed by Anais Oviedo MD on 9/20/2021 at 3:36 PM    This note is created with a voice recognition program and while intend to generate a document that accurately reflects the content of the visit, no guarantee can be provided that every mistake has been identified and corrected by editing.

## 2021-10-18 ENCOUNTER — TELEPHONE (OUTPATIENT)
Dept: FAMILY MEDICINE CLINIC | Age: 81
End: 2021-10-18

## 2021-10-18 NOTE — TELEPHONE ENCOUNTER
Tracy Dow was contacted as a part of Medicare Annual Wellness Visit outreach. Unable to contact patient.

## 2022-04-27 ENCOUNTER — OFFICE VISIT (OUTPATIENT)
Dept: INTERNAL MEDICINE CLINIC | Age: 82
End: 2022-04-27
Payer: MEDICARE

## 2022-04-27 VITALS
RESPIRATION RATE: 16 BRPM | OXYGEN SATURATION: 98 % | HEART RATE: 72 BPM | SYSTOLIC BLOOD PRESSURE: 134 MMHG | DIASTOLIC BLOOD PRESSURE: 68 MMHG | TEMPERATURE: 97.1 F | BODY MASS INDEX: 27.66 KG/M2 | HEIGHT: 70 IN | WEIGHT: 193.2 LBS

## 2022-04-27 DIAGNOSIS — Z00.00 MEDICARE ANNUAL WELLNESS VISIT, SUBSEQUENT: Primary | ICD-10-CM

## 2022-04-27 PROCEDURE — G0439 PPPS, SUBSEQ VISIT: HCPCS | Performed by: INTERNAL MEDICINE

## 2022-04-27 ASSESSMENT — LIFESTYLE VARIABLES
HOW MANY STANDARD DRINKS CONTAINING ALCOHOL DO YOU HAVE ON A TYPICAL DAY: 1 OR 2
HOW OFTEN DO YOU HAVE A DRINK CONTAINING ALCOHOL: NEVER

## 2022-04-27 ASSESSMENT — PATIENT HEALTH QUESTIONNAIRE - PHQ9
1. LITTLE INTEREST OR PLEASURE IN DOING THINGS: 0
SUM OF ALL RESPONSES TO PHQ9 QUESTIONS 1 & 2: 0
SUM OF ALL RESPONSES TO PHQ QUESTIONS 1-9: 0
2. FEELING DOWN, DEPRESSED OR HOPELESS: 0
SUM OF ALL RESPONSES TO PHQ QUESTIONS 1-9: 0

## 2022-04-27 NOTE — PATIENT INSTRUCTIONS
Personalized Preventive Plan for Terry Darden - 4/27/2022  Medicare offers a range of preventive health benefits. Some of the tests and screenings are paid in full while other may be subject to a deductible, co-insurance, and/or copay. Some of these benefits include a comprehensive review of your medical history including lifestyle, illnesses that may run in your family, and various assessments and screenings as appropriate. After reviewing your medical record and screening and assessments performed today your provider may have ordered immunizations, labs, imaging, and/or referrals for you. A list of these orders (if applicable) as well as your Preventive Care list are included within your After Visit Summary for your review. Other Preventive Recommendations:    · A preventive eye exam performed by an eye specialist is recommended every 1-2 years to screen for glaucoma; cataracts, macular degeneration, and other eye disorders. · A preventive dental visit is recommended every 6 months. · Try to get at least 150 minutes of exercise per week or 10,000 steps per day on a pedometer . · Order or download the FREE \"Exercise & Physical Activity: Your Everyday Guide\" from [employer]. Call 9-980.843.6793 or search [employer] online. · You need 6874-5086 mg of calcium and 8976-0463 IU of vitamin D per day. It is possible to meet your calcium requirement with diet alone, but a vitamin D supplement is usually necessary to meet this goal.  · When exposed to the sun, use a sunscreen that protects against both UVA and UVB radiation with an SPF of 30 or greater. Reapply every 2 to 3 hours or after sweating, drying off with a towel, or swimming. · Always wear a seat belt when traveling in a car. Always wear a helmet when riding a bicycle or motorcycle.

## 2022-04-27 NOTE — PROGRESS NOTES
scheduled    Health Habits/Nutrition Interventions:  · Inadequate physical activity:  patient agrees to increase physical activity as follows: As tolerated    Hearing/Vision:  Do you or your family notice any trouble with your hearing that hasn't been managed with hearing aids?: No  Do you have difficulty driving, watching TV, or doing any of your daily activities because of your eyesight?: No  Have you had an eye exam within the past year?: (!) No  No exam data present    Hearing/Vision Interventions:  · No issues    Safety:  Do you have working smoke detectors?: Yes  Do you have any tripping hazards - loose or unsecured carpets or rugs?: (!) Yes  Do you have any tripping hazards - clutter in doorways, halls, or stairs?: No  Do you have either shower bars, grab bars, non-slip mats or non-slip surfaces in your shower or bathtub?: Yes  Do all of your stairways have a railing or banister?: Yes  Do you always fasten your seatbelt when you are in a car?: Yes    Safety Interventions:  · Home safety tips provided           Objective   Vitals:    04/27/22 1411   BP: 134/68   Site: Right Upper Arm   Position: Sitting   Cuff Size: Large Adult   Pulse: 72   Resp: 16   Temp: 97.1 °F (36.2 °C)   TempSrc: Temporal   SpO2: 98%   Weight: 193 lb 3.2 oz (87.6 kg)   Height: 5' 10\" (1.778 m)      Body mass index is 27.72 kg/m².         General Appearance: alert and oriented to person, place and time, well developed and well- nourished, in no acute distress  Skin: warm and dry, no rash or erythema  Head: normocephalic and atraumatic  Eyes: pupils equal, round, and reactive to light, extraocular eye movements intact, conjunctivae normal  ENT: tympanic membrane, external ear and ear canal normal bilaterally, nose without deformity, nasal mucosa and turbinates normal without polyps  Neck: supple and non-tender without mass, no thyromegaly or thyroid nodules, no cervical lymphadenopathy  Pulmonary/Chest: clear to auscultation bilaterally- no wheezes, rales or rhonchi, normal air movement, no respiratory distress  Cardiovascular: normal rate, regular rhythm, normal S1 and S2, no murmurs, rubs, clicks, or gallops, distal pulses intact, no carotid bruits  Abdomen: soft, non-tender, non-distended, normal bowel sounds, no masses or organomegaly  Extremities: no cyanosis, clubbing or edema  Musculoskeletal: normal range of motion, no joint swelling, deformity or tenderness  Neurologic: reflexes normal and symmetric, no cranial nerve deficit, gait, coordination and speech normal       Allergies   Allergen Reactions    Oxycodone-Acetaminophen Other (See Comments)     Pain in the chest, ears, dizzy.  Tramadol Hcl Other (See Comments)    Codeine Nausea And Vomiting     Prior to Visit Medications    Medication Sig Taking? Authorizing Provider   fluticasone (FLONASE) 50 MCG/ACT nasal spray 1 spray in each nostril Yes Isiah LOJA MD   acetaminophen (TYLENOL) 500 MG tablet Take 2 tablets by mouth every 8 hours as needed for Pain Yes Cherelle Santos DO   atorvastatin (LIPITOR) 40 MG tablet TAKE 1 TABLET BY MOUTH ONE TIME A DAY Yes Starlyn Severs, MD   Menaquinone-7 (VITAMIN K2 PO) Take 100 mcg by mouth daily Yes Vernell Gallo MD   Probiotic Product (PROBIOTIC-10 PO) Take by mouth every other day  Yes Historical Provider, MD   LORazepam (ATIVAN) 1 MG tablet Take 1 mg by mouth nightly as needed. Yes Historical Provider, MD   lidocaine (LMX) 4 % cream Apply topically daily as needed for Pain Apply topically as needed.  Yes Historical Provider, MD   b complex vitamins capsule Take 1 capsule by mouth daily Yes Historical Provider, MD   Misc Natural Products (PUMPKIN SEED OIL PO) Take by mouth daily Yes Historical Provider, MD [place] 450 MG CAPS Take 1 capsule by mouth 2 times daily  Yes Historical Provider, MD   diltiazem (CARDIZEM CD) 120 MG extended release capsule Take 40 mg by mouth daily  Yes Historical Provider, MD silodosin (RAPAFLO) 8 MG CAPS Take 8 mg by mouth every evening  Yes Historical Provider, MD   dabigatran (PRADAXA) 150 MG capsule Take 150 mg by mouth 2 times daily Yes Historical Provider, MD   Magnesium 500 MG TABS Take 500 mg by mouth  Yes Historical Provider, MD   aspirin 81 MG tablet Take 81 mg by mouth daily Yes Historical Provider, MD   gabapentin (NEURONTIN) 300 MG capsule Take 900 mg by mouth 3 times daily. . Yes Historical Provider, MD [place] 500 MG CAPS Take 1 each by mouth every other day  Yes Historical Provider, MD   nitroGLYCERIN (NITROSTAT) 0.4 MG SL tablet Place 0.4 mg under the tongue every 5 minutes as needed. Yes Historical Provider, MD   budesonide-formoterol (SYMBICORT) 160-4.5 MCG/ACT AERO Inhale 2 puffs into the lungs 2 times daily. Yes Historical Provider, MD   Cholecalciferol (VITAMIN D3) 2000 UNITS TABS Take 1 tablet by mouth daily  Yes Historical Provider, MD   lidocaine (XYLOCAINE) 4 % external solution Apply topically 3 times daily Apply topically as needed.   Patient not taking: Reported on 9/20/2021  Historical Provider, MD   albuterol sulfate HFA (PROAIR HFA) 108 (90 Base) MCG/ACT inhaler 2 puffs as needed  Patient not taking: Reported on 9/20/2021  Historical Provider, MD [place] (Including outside providers/suppliers regularly involved in providing care):   Patient Care Team:  Vicky Yepez MD as PCP - General (Internal Medicine)  Vicky Yepez MD as PCP - [facility]  Lenny Germain MD as Consulting Physician (Cardiology)    Reviewed and updated this visit:  Tobacco  Allergies  Meds  Med Hx  Surg Hx  Soc Hx  Fam Hx      Patient will be following with Kanchan Brito to see his cardiologist soon and will get lab work

## 2022-05-17 RX ORDER — ATORVASTATIN CALCIUM 40 MG/1
TABLET, FILM COATED ORAL
Qty: 90 TABLET | Refills: 1 | Status: SHIPPED | OUTPATIENT
Start: 2022-05-17

## 2022-05-17 NOTE — TELEPHONE ENCOUNTER
Valeria Pham is calling to request a refill on the following medication(s):    Medication Request:  Requested Prescriptions     Pending Prescriptions Disp Refills    atorvastatin (LIPITOR) 40 MG tablet [Pharmacy Med Name: ATORVASTATIN TABS 40MG] 90 tablet 3     Sig: TAKE 1 TABLET DAILY     Last Fill: 5/19/21  Last Visit Date (If Applicable):  0/10/3045    Next Visit Date:    Visit date not found

## 2022-11-05 NOTE — TELEPHONE ENCOUNTER
[facility] scheduling dept called to report MRI Cervical spine has been denied. If peer to peer appeal is desired, call [phone]. Please inform Deana of decision(if appealing or not). MRI Brain, lumbar, and thoracic have been approved. Scheduled for tomorrow. details…

## 2023-05-31 ENCOUNTER — TELEPHONE (OUTPATIENT)
Dept: INTERNAL MEDICINE CLINIC | Age: 83
End: 2023-05-31

## 2023-06-06 ENCOUNTER — OFFICE VISIT (OUTPATIENT)
Dept: INTERNAL MEDICINE CLINIC | Age: 83
End: 2023-06-06
Payer: MEDICARE

## 2023-06-06 VITALS
OXYGEN SATURATION: 98 % | RESPIRATION RATE: 18 BRPM | TEMPERATURE: 97.3 F | WEIGHT: 180 LBS | SYSTOLIC BLOOD PRESSURE: 130 MMHG | DIASTOLIC BLOOD PRESSURE: 76 MMHG | BODY MASS INDEX: 25.77 KG/M2 | HEART RATE: 73 BPM | HEIGHT: 70 IN

## 2023-06-06 DIAGNOSIS — Z00.00 MEDICARE ANNUAL WELLNESS VISIT, SUBSEQUENT: Primary | ICD-10-CM

## 2023-06-06 PROCEDURE — G0439 PPPS, SUBSEQ VISIT: HCPCS | Performed by: INTERNAL MEDICINE

## 2023-06-06 PROCEDURE — 1123F ACP DISCUSS/DSCN MKR DOCD: CPT | Performed by: INTERNAL MEDICINE

## 2023-06-06 RX ORDER — GLUCOSA SU 2KCL/CHONDROITIN SU 500-400 MG
CAPSULE ORAL
COMMUNITY

## 2023-06-06 RX ORDER — MONTELUKAST SODIUM 10 MG/1
10 TABLET ORAL NIGHTLY
COMMUNITY

## 2023-06-06 SDOH — ECONOMIC STABILITY: FOOD INSECURITY: WITHIN THE PAST 12 MONTHS, THE FOOD YOU BOUGHT JUST DIDN'T LAST AND YOU DIDN'T HAVE MONEY TO GET MORE.: NEVER TRUE

## 2023-06-06 SDOH — ECONOMIC STABILITY: FOOD INSECURITY: WITHIN THE PAST 12 MONTHS, YOU WORRIED THAT YOUR FOOD WOULD RUN OUT BEFORE YOU GOT MONEY TO BUY MORE.: NEVER TRUE

## 2023-06-06 SDOH — ECONOMIC STABILITY: INCOME INSECURITY: HOW HARD IS IT FOR YOU TO PAY FOR THE VERY BASICS LIKE FOOD, HOUSING, MEDICAL CARE, AND HEATING?: NOT HARD AT ALL

## 2023-06-06 SDOH — ECONOMIC STABILITY: HOUSING INSECURITY
IN THE LAST 12 MONTHS, WAS THERE A TIME WHEN YOU DID NOT HAVE A STEADY PLACE TO SLEEP OR SLEPT IN A SHELTER (INCLUDING NOW)?: NO

## 2023-06-06 ASSESSMENT — PATIENT HEALTH QUESTIONNAIRE - PHQ9
SUM OF ALL RESPONSES TO PHQ QUESTIONS 1-9: 0
SUM OF ALL RESPONSES TO PHQ QUESTIONS 1-9: 0
SUM OF ALL RESPONSES TO PHQ9 QUESTIONS 1 & 2: 0
SUM OF ALL RESPONSES TO PHQ QUESTIONS 1-9: 0
SUM OF ALL RESPONSES TO PHQ QUESTIONS 1-9: 0
1. LITTLE INTEREST OR PLEASURE IN DOING THINGS: 0
2. FEELING DOWN, DEPRESSED OR HOPELESS: 0

## 2023-06-06 ASSESSMENT — LIFESTYLE VARIABLES: HOW OFTEN DO YOU HAVE A DRINK CONTAINING ALCOHOL: NEVER

## 2023-06-06 NOTE — PROGRESS NOTES
% cream Apply topically daily as needed for Pain Apply topically as needed. 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  Yes Historical Provider, MD [place] 500 MG CAPS Take 1 each by mouth every other day  Yes Historical Provider, MD   nitroGLYCERIN (NITROSTAT) 0.4 MG SL tablet Place 1 tablet under the tongue every 5 minutes as needed Yes Historical Provider, MD   budesonide-formoterol (SYMBICORT) 160-4.5 MCG/ACT AERO Inhale 2 puffs into the lungs 2 times daily Yes Historical Provider, MD   Cholecalciferol (VITAMIN D3) 2000 UNITS TABS Take 1 tablet by mouth daily Yes Historical Provider, MD [place] (Including outside providers/suppliers regularly involved in providing care):   Patient Care Team:  Jude Dia MD as PCP - General (Internal Medicine)  Jude Dia MD as PCP - Empaneled Provider  Megan Sparks MD as Consulting Physician (Cardiology)     Reviewed and updated this visit:  Tobacco  Allergies  Meds  Med Hx  Surg Hx  Soc Hx  Fam Hx

## 2023-06-06 NOTE — PATIENT INSTRUCTIONS
counseling. Spiritual or Latter-day groups: They can provide comfort and may be able to help you find counseling or other social support services. Social groups: They can help you meet new people and get involved in activities you enjoy. Community support groups: In a support group, you can talk to others who have dealt with the same problems or illness as you. You can encourage one another and learn ways to cope with tough emotions. How to find a support group  Ask your doctor, counselor, or other health professional for suggestions. Contact your local Confucianism, [employer], Christianity, or other Latter-day group. Ask your family and friends. Ask people who have the same health concerns. Go online. Forums and blogs let you read messages from others and leave your own messages. You can exchange stories, vent your frustrations, and ask and answer questions. Contact a city, state, or national group that provides support for your health concerns. Your library or community center may have a list of these groups. Or you can look for information online. Look for a support group that works for you. Ask yourself if you prefer structure and would like a , or if you would like a less formal group. Do you prefer face-to-face meetings? Or do you feel more secure in online chat rooms or forums? Supportive relationships  A supportive relationship includes emotional support such as love, trust, and understanding, as well as advice and concrete help, such as help managing your time. Reach out to others  Family and friends can help you. Ask them to:  Listen to you and give you encouragement. This can keep you from feeling hopeless or alone. Help with small daily tasks or with bigger problems. A helping hand can keep you from feeling overwhelmed. Help you manage a health problem. For example, ask them to go to doctor visits with you. Your loved ones can offer support by being involved in your medical care.   Respect

## 2023-10-17 ENCOUNTER — OFFICE VISIT (OUTPATIENT)
Dept: INTERNAL MEDICINE CLINIC | Age: 83
End: 2023-10-17
Payer: MEDICARE

## 2023-10-17 VITALS
OXYGEN SATURATION: 98 % | WEIGHT: 175.2 LBS | HEIGHT: 70 IN | TEMPERATURE: 97.4 F | BODY MASS INDEX: 25.08 KG/M2 | DIASTOLIC BLOOD PRESSURE: 70 MMHG | RESPIRATION RATE: 20 BRPM | HEART RATE: 83 BPM | SYSTOLIC BLOOD PRESSURE: 120 MMHG

## 2023-10-17 DIAGNOSIS — M54.2 NECK PAIN: ICD-10-CM

## 2023-10-17 DIAGNOSIS — M54.50 ACUTE RIGHT-SIDED LOW BACK PAIN WITHOUT SCIATICA: Primary | ICD-10-CM

## 2023-10-17 PROCEDURE — G8484 FLU IMMUNIZE NO ADMIN: HCPCS | Performed by: INTERNAL MEDICINE

## 2023-10-17 PROCEDURE — G8419 CALC BMI OUT NRM PARAM NOF/U: HCPCS | Performed by: INTERNAL MEDICINE

## 2023-10-17 PROCEDURE — 1036F TOBACCO NON-USER: CPT | Performed by: INTERNAL MEDICINE

## 2023-10-17 PROCEDURE — 99214 OFFICE O/P EST MOD 30 MIN: CPT | Performed by: INTERNAL MEDICINE

## 2023-10-17 PROCEDURE — G8427 DOCREV CUR MEDS BY ELIG CLIN: HCPCS | Performed by: INTERNAL MEDICINE

## 2023-10-17 PROCEDURE — 1123F ACP DISCUSS/DSCN MKR DOCD: CPT | Performed by: INTERNAL MEDICINE

## 2023-10-17 RX ORDER — PREDNISONE 10 MG/1
10 TABLET ORAL
Qty: 15 TABLET | Refills: 0 | Status: SHIPPED | OUTPATIENT
Start: 2023-10-17 | End: 2023-10-22

## 2023-10-17 NOTE — PROGRESS NOTES
Sig: Take 1 tablet by mouth 3 times daily (with meals) for 5 days     Dispense:  15 tablet     Refill:  0          Completed Refills               Requested Prescriptions     Signed Prescriptions Disp Refills    predniSONE (DELTASONE) 10 MG tablet 15 tablet 0     Sig: Take 1 tablet by mouth 3 times daily (with meals) for 5 days     4. Patient given educational materials - see patient instructions    5. Was a self-tracking handout given in paper form or via [facility]? NO    No orders of the defined types were placed in this encounter. No follow-ups on file. Patient voiced understanding and agreed to treatment plan. Electronically signed by Michi Lagunas MD on 10/17/2023 at 10:39 AM    This note is created with a voice recognition program and while intend to generate a document that accurately reflects the content of the visit, no guarantee can be provided that every mistake has been identified and corrected by editing.

## 2023-10-24 ENCOUNTER — HOSPITAL ENCOUNTER (OUTPATIENT)
Dept: NUCLEAR MEDICINE | Age: 83
Discharge: HOME | End: 2023-10-26
Attending: INTERNAL MEDICINE
Payer: MEDICARE

## 2023-10-24 ENCOUNTER — HOSPITAL ENCOUNTER (OUTPATIENT)
Age: 83
Discharge: HOME | End: 2023-10-26
Attending: INTERNAL MEDICINE
Payer: MEDICARE

## 2023-10-24 VITALS
HEIGHT: 70 IN | RESPIRATION RATE: 18 BRPM | DIASTOLIC BLOOD PRESSURE: 97 MMHG | WEIGHT: 175 LBS | SYSTOLIC BLOOD PRESSURE: 176 MMHG | HEART RATE: 74 BPM | BODY MASS INDEX: 25.05 KG/M2

## 2023-10-24 DIAGNOSIS — R07.89 OTHER CHEST PAIN: ICD-10-CM

## 2023-10-24 DIAGNOSIS — Z95.1 POSTSURGICAL AORTOCORONARY BYPASS STATUS: ICD-10-CM

## 2023-10-24 LAB
NUC STRESS EJECTION FRACTION: 48 %
STRESS BASELINE ST DEPRESSION: 0 MM
STRESS ST DEPRESSION: 0 MM
STRESS TARGET HR: 138 BPM
TID: 1.12

## 2023-10-24 PROCEDURE — A9500 TC99M SESTAMIBI: HCPCS | Performed by: INTERNAL MEDICINE

## 2023-10-24 PROCEDURE — 6360000002 HC RX W HCPCS: Performed by: INTERNAL MEDICINE

## 2023-10-24 PROCEDURE — 2580000003 HC RX 258: Performed by: INTERNAL MEDICINE

## 2023-10-24 PROCEDURE — 3430000000 HC RX DIAGNOSTIC RADIOPHARMACEUTICAL: Performed by: INTERNAL MEDICINE

## 2023-10-24 PROCEDURE — 93017 CV STRESS TEST TRACING ONLY: CPT

## 2023-10-24 PROCEDURE — 78452 HT MUSCLE IMAGE SPECT MULT: CPT

## 2023-10-24 RX ORDER — TETRAKIS(2-METHOXYISOBUTYLISOCYANIDE)COPPER(I) TETRAFLUOROBORATE 1 MG/ML
39.9 INJECTION, POWDER, LYOPHILIZED, FOR SOLUTION INTRAVENOUS
Status: COMPLETED | OUTPATIENT
Start: 2023-10-24 | End: 2023-10-24

## 2023-10-24 RX ORDER — METOPROLOL TARTRATE 5 MG/5ML
5 INJECTION INTRAVENOUS EVERY 5 MIN PRN
Status: ACTIVE | OUTPATIENT
Start: 2023-10-24 | End: 2023-10-24

## 2023-10-24 RX ORDER — REGADENOSON 0.08 MG/ML
0.4 INJECTION, SOLUTION INTRAVENOUS
Status: COMPLETED | OUTPATIENT
Start: 2023-10-24 | End: 2023-10-24

## 2023-10-24 RX ORDER — SODIUM CHLORIDE 9 MG/ML
500 INJECTION, SOLUTION INTRAVENOUS CONTINUOUS PRN
Status: ACTIVE | OUTPATIENT
Start: 2023-10-24 | End: 2023-10-24

## 2023-10-24 RX ORDER — TETRAKIS(2-METHOXYISOBUTYLISOCYANIDE)COPPER(I) TETRAFLUOROBORATE 1 MG/ML
14.2 INJECTION, POWDER, LYOPHILIZED, FOR SOLUTION INTRAVENOUS
Status: COMPLETED | OUTPATIENT
Start: 2023-10-24 | End: 2023-10-24

## 2023-10-24 RX ORDER — ATROPINE SULFATE 0.1 MG/ML
0.5 INJECTION INTRAVENOUS EVERY 5 MIN PRN
Status: ACTIVE | OUTPATIENT
Start: 2023-10-24 | End: 2023-10-24

## 2023-10-24 RX ORDER — NITROGLYCERIN 0.4 MG/1
0.4 TABLET SUBLINGUAL EVERY 5 MIN PRN
Status: ACTIVE | OUTPATIENT
Start: 2023-10-24 | End: 2023-10-24

## 2023-10-24 RX ORDER — SODIUM CHLORIDE 0.9 % (FLUSH) 0.9 %
5-40 SYRINGE (ML) INJECTION PRN
Status: ACTIVE | OUTPATIENT
Start: 2023-10-24 | End: 2023-10-24

## 2023-10-24 RX ORDER — AMINOPHYLLINE DIHYDRATE 25 MG/ML
50 INJECTION, SOLUTION INTRAVENOUS PRN
Status: ACTIVE | OUTPATIENT
Start: 2023-10-24 | End: 2023-10-24

## 2023-10-24 RX ADMIN — Medication 39.9 MILLICURIE: at 08:22

## 2023-10-24 RX ADMIN — REGADENOSON 0.4 MG: 0.08 INJECTION, SOLUTION INTRAVENOUS at 08:22

## 2023-10-24 RX ADMIN — Medication 14.2 MILLICURIE: at 07:05

## 2023-10-24 RX ADMIN — SODIUM CHLORIDE, PRESERVATIVE FREE 10 ML: 5 INJECTION INTRAVENOUS at 08:25

## 2023-10-24 NOTE — NURSING NOTE
Cardiac stress test for increased fatigue & SOB on exertion. Tolerated well V/S as documented; see MD notes.

## 2024-05-23 ENCOUNTER — OFFICE VISIT (OUTPATIENT)
Dept: INTERNAL MEDICINE CLINIC | Age: 84
End: 2024-05-23
Payer: MEDICARE

## 2024-05-23 VITALS
HEART RATE: 73 BPM | OXYGEN SATURATION: 99 % | HEIGHT: 70 IN | TEMPERATURE: 98.1 F | SYSTOLIC BLOOD PRESSURE: 120 MMHG | DIASTOLIC BLOOD PRESSURE: 70 MMHG | RESPIRATION RATE: 16 BRPM | BODY MASS INDEX: 25.68 KG/M2 | WEIGHT: 179.4 LBS

## 2024-05-23 DIAGNOSIS — K62.5 RECTAL BLEEDING: ICD-10-CM

## 2024-05-23 DIAGNOSIS — Z13.6 SCREENING FOR CARDIOVASCULAR CONDITION: ICD-10-CM

## 2024-05-23 DIAGNOSIS — L98.9 LESION OF SKIN OF SCALP: ICD-10-CM

## 2024-05-23 DIAGNOSIS — R06.02 SOB (SHORTNESS OF BREATH): ICD-10-CM

## 2024-05-23 DIAGNOSIS — G62.9 NEUROPATHY: ICD-10-CM

## 2024-05-23 DIAGNOSIS — Z00.00 MEDICARE ANNUAL WELLNESS VISIT, SUBSEQUENT: Primary | ICD-10-CM

## 2024-05-23 PROCEDURE — G8427 DOCREV CUR MEDS BY ELIG CLIN: HCPCS | Performed by: INTERNAL MEDICINE

## 2024-05-23 PROCEDURE — 99214 OFFICE O/P EST MOD 30 MIN: CPT | Performed by: INTERNAL MEDICINE

## 2024-05-23 PROCEDURE — 1036F TOBACCO NON-USER: CPT | Performed by: INTERNAL MEDICINE

## 2024-05-23 PROCEDURE — G0439 PPPS, SUBSEQ VISIT: HCPCS | Performed by: INTERNAL MEDICINE

## 2024-05-23 PROCEDURE — G8419 CALC BMI OUT NRM PARAM NOF/U: HCPCS | Performed by: INTERNAL MEDICINE

## 2024-05-23 PROCEDURE — 1123F ACP DISCUSS/DSCN MKR DOCD: CPT | Performed by: INTERNAL MEDICINE

## 2024-05-23 ASSESSMENT — PATIENT HEALTH QUESTIONNAIRE - PHQ9
SUM OF ALL RESPONSES TO PHQ QUESTIONS 1-9: 4
SUM OF ALL RESPONSES TO PHQ QUESTIONS 1-9: 4
9. THOUGHTS THAT YOU WOULD BE BETTER OFF DEAD, OR OF HURTING YOURSELF: NOT AT ALL
SUM OF ALL RESPONSES TO PHQ9 QUESTIONS 1 & 2: 4
6. FEELING BAD ABOUT YOURSELF - OR THAT YOU ARE A FAILURE OR HAVE LET YOURSELF OR YOUR FAMILY DOWN: NOT AT ALL
8. MOVING OR SPEAKING SO SLOWLY THAT OTHER PEOPLE COULD HAVE NOTICED. OR THE OPPOSITE, BEING SO FIGETY OR RESTLESS THAT YOU HAVE BEEN MOVING AROUND A LOT MORE THAN USUAL: NOT AT ALL
2. FEELING DOWN, DEPRESSED OR HOPELESS: SEVERAL DAYS
1. LITTLE INTEREST OR PLEASURE IN DOING THINGS: NEARLY EVERY DAY
SUM OF ALL RESPONSES TO PHQ QUESTIONS 1-9: 4
3. TROUBLE FALLING OR STAYING ASLEEP: NOT AT ALL
7. TROUBLE CONCENTRATING ON THINGS, SUCH AS READING THE NEWSPAPER OR WATCHING TELEVISION: NOT AT ALL
5. POOR APPETITE OR OVEREATING: NOT AT ALL
4. FEELING TIRED OR HAVING LITTLE ENERGY: NOT AT ALL
10. IF YOU CHECKED OFF ANY PROBLEMS, HOW DIFFICULT HAVE THESE PROBLEMS MADE IT FOR YOU TO DO YOUR WORK, TAKE CARE OF THINGS AT HOME, OR GET ALONG WITH OTHER PEOPLE: NOT DIFFICULT AT ALL
SUM OF ALL RESPONSES TO PHQ QUESTIONS 1-9: 4

## 2024-05-23 ASSESSMENT — LIFESTYLE VARIABLES: HOW OFTEN DO YOU HAVE A DRINK CONTAINING ALCOHOL: NEVER

## 2024-05-23 NOTE — PATIENT INSTRUCTIONS
you are in danger. It makes hormones that speed up your heart, make you breathe faster, and give you a burst of energy. This is called the fight-or-flight stress response. If the stress is over quickly, your body goes back to normal and no harm is done.  But if stress happens too often or lasts too long, it can have bad effects. Long-term stress can make you more likely to get sick, and it can make symptoms of some diseases worse. If you tense up when you are stressed, you may develop neck, shoulder, or low back pain. Stress is linked to high blood pressure and heart disease.  Stress also harms your emotional health. It can make you vargas, tense, or depressed. Your relationships may suffer, and you may not do well at work or school.  What can you do to manage stress?  You can try these things to help manage stress:   Do something active. Exercise or activity can help reduce stress. Walking is a great way to get started. Even everyday activities such as housecleaning or yard work can help.  Try yoga or jolanta chi. These techniques combine exercise and meditation. You may need some training at first to learn them.  Do something you enjoy. For example, listen to music or go to a movie. Practice your hobby or do volunteer work.  Meditate. This can help you relax, because you are not worrying about what happened before or what may happen in the future.  Do guided imagery. Imagine yourself in any setting that helps you feel calm. You can use online videos, books, or a teacher to guide you.  Do breathing exercises. For example:  From a standing position, bend forward from the waist with your knees slightly bent. Let your arms dangle close to the floor.  Breathe in slowly and deeply as you return to a standing position. Roll up slowly and lift your head last.  Hold your breath for just a few seconds in the standing position.  Breathe out slowly and bend forward from the waist.  Let your feelings out. Talk, laugh, cry, and

## 2024-05-23 NOTE — PROGRESS NOTES
Medications    Medication Sig Taking? Authorizing Provider   ZINC PO Take by mouth Yes Mary Beth Wright MD   Coenzyme Q10 (CO Q10) 100 MG CAPS Take by mouth Yes Mary Beth Wright MD   atorvastatin (LIPITOR) 40 MG tablet TAKE 1 TABLET DAILY  Patient taking differently: Take 0.5 tablets by mouth nightly TAKE 1 TABLET DAILY Yes Aleks Boone MD   acetaminophen (TYLENOL) 500 MG tablet Take 2 tablets by mouth every 8 hours as needed for Pain Yes Cherelle Helms,    lidocaine (LMX) 4 % cream Apply topically daily as needed for Pain Apply topically as needed. Yes Mary Beth Wright MD   b complex vitamins capsule Take 1 capsule by mouth daily Yes Mary Beth Wright MD   diltiazem (CARDIZEM CD) 120 MG extended release capsule Take 40 mg by mouth daily  Yes Mary Beth Wright MD   dabigatran (PRADAXA) 150 MG capsule Take 1 capsule by mouth 2 times daily Yes Mary Beth Wright MD   Magnesium 500 MG TABS Take 500 mg by mouth  Yes Mary Beth Wright MD   aspirin 81 MG tablet Take 1 tablet by mouth daily Yes Mary Beth Wright MD   gabapentin (NEURONTIN) 300 MG capsule Take 3 capsules by mouth 3 times daily. Yes Mary Beth Wright MD   Krill Oil 500 MG CAPS Take 1 each by mouth every other day  Yes Mary Beth Wright MD   nitroGLYCERIN (NITROSTAT) 0.4 MG SL tablet Place 1 tablet under the tongue every 5 minutes as needed Yes Mary Beth Wright MD   Cholecalciferol (VITAMIN D3) 2000 UNITS TABS Take 1 tablet by mouth daily Yes Mary Beth Wright MD   Menaquinone-7 (VITAMIN K2 PO) Take 100 mcg by mouth daily  Patient not taking: Reported on 5/23/2024  Soto Grady Jr., MD   Probiotic Product (PROBIOTIC-10 PO) Take by mouth every other day   Patient not taking: Reported on 5/23/2024  Mary Beth Wright MD       CareTeam (Including outside providers/suppliers regularly involved in providing care):   Patient Care Team:  Aleks Boone MD as PCP - General (Internal

## 2024-05-24 ENCOUNTER — TELEPHONE (OUTPATIENT)
Dept: INTERNAL MEDICINE CLINIC | Age: 84
End: 2024-05-24

## 2024-05-24 DIAGNOSIS — R06.02 SOB (SHORTNESS OF BREATH): ICD-10-CM

## 2024-05-24 NOTE — TELEPHONE ENCOUNTER
Patient was asking when he checkout about getting a referral to see a neurosurgeon or neurology  not sure why

## 2024-08-14 ENCOUNTER — TELEPHONE (OUTPATIENT)
Dept: FAMILY MEDICINE CLINIC | Age: 84
End: 2024-08-14

## 2024-08-14 NOTE — TELEPHONE ENCOUNTER
Patient had surgery at [facility] (aortic valve stenosis ) notes in careeverywhere  Asking if he needs to follow up   Mariaelena has an appt on Monday if so maybe he can his appt

## 2024-08-19 ENCOUNTER — OFFICE VISIT (OUTPATIENT)
Dept: FAMILY MEDICINE CLINIC | Age: 84
End: 2024-08-19

## 2024-08-19 VITALS
WEIGHT: 180 LBS | BODY MASS INDEX: 25.77 KG/M2 | OXYGEN SATURATION: 96 % | DIASTOLIC BLOOD PRESSURE: 58 MMHG | SYSTOLIC BLOOD PRESSURE: 134 MMHG | TEMPERATURE: 97.4 F | HEIGHT: 70 IN | HEART RATE: 64 BPM

## 2024-08-19 DIAGNOSIS — R33.9 URINARY RETENTION: ICD-10-CM

## 2024-08-19 DIAGNOSIS — Z95.2 S/P TAVR (TRANSCATHETER AORTIC VALVE REPLACEMENT): Primary | ICD-10-CM

## 2024-08-19 DIAGNOSIS — Z96.0 URINARY CATHETER IN PLACE: ICD-10-CM

## 2024-08-19 DIAGNOSIS — Z09 HOSPITAL DISCHARGE FOLLOW-UP: ICD-10-CM

## 2024-08-19 DIAGNOSIS — I97.640 POSTOPERATIVE SEROMA INVOLVING CIRCULATORY SYSTEM AFTER CARDIAC CATHETERIZATION: ICD-10-CM

## 2024-08-19 RX ORDER — OXYBUTYNIN CHLORIDE 5 MG/1
5 TABLET ORAL 2 TIMES DAILY
COMMUNITY
Start: 2024-08-15

## 2024-08-19 RX ORDER — CEPHALEXIN 500 MG/1
CAPSULE ORAL
COMMUNITY
Start: 2024-08-18

## 2024-08-19 RX ORDER — HYOSCYAMINE SULFATE 0.125 MG
0.12 TABLET,DISINTEGRATING ORAL EVERY 4 HOURS PRN
COMMUNITY
Start: 2024-08-15 | End: 2024-08-19

## 2024-08-19 SDOH — ECONOMIC STABILITY: INCOME INSECURITY: HOW HARD IS IT FOR YOU TO PAY FOR THE VERY BASICS LIKE FOOD, HOUSING, MEDICAL CARE, AND HEATING?: NOT HARD AT ALL

## 2024-08-19 SDOH — ECONOMIC STABILITY: FOOD INSECURITY: WITHIN THE PAST 12 MONTHS, THE FOOD YOU BOUGHT JUST DIDN'T LAST AND YOU DIDN'T HAVE MONEY TO GET MORE.: NEVER TRUE

## 2024-08-19 SDOH — ECONOMIC STABILITY: FOOD INSECURITY: WITHIN THE PAST 12 MONTHS, YOU WORRIED THAT YOUR FOOD WOULD RUN OUT BEFORE YOU GOT MONEY TO BUY MORE.: NEVER TRUE

## 2024-08-19 NOTE — PROGRESS NOTES
Post-Discharge Transitional Care Management Progress Note      Manjeet Hobson   YOB: 1940    Date of Office Visit:  8/19/2024  Date of Hospital Admission: 8/13/2024  Date of Hospital Discharge: 8/14/2024    Care management risk score Rising risk (score 2-5) and Complex Care (Scores >=6): No Risk Score On File     Non face to face  following discharge, date last encounter closed (first attempt may have been earlier): *No documented post hospital discharge outreach found in the last 14 days *No documented post hospital discharge outreach found in the last 14 days    Call initiated 2 business days of discharge: *No response recorded in the last 14 days    ASSESSMENT/PLAN:   Hospital discharge follow-up  -     ME DISCHARGE MEDS RECONCILED W/ CURRENT OUTPATIENT MED LIST    Medical Decision Making: high complexity  Return in about 4 months (around 12/19/2024).    On this date 8/19/2024 I have spent 45 minutes reviewing previous notes, test results and face to face with the patient discussing the diagnosis and importance of compliance with the treatment plan as well as documenting on the day of the visit.     Subjective:   HPI:  Follow up of Hospital problems/diagnosis(es): Patient had aortic valve stenosis and patient had TAVR procedure done and patient doing well and before discharge patient had difficulty urinating and patient had urinary retention and so urology was consulted and a catheter was placed and patient was subsequently discharged the next day and subsequently patient developed a seroma at the groin where they did a cath procedure and patient presented to the emergency room and was treated with antibiotics and advised to dress the wound  Patient will be seeing the cardiologist on Wednesday and also has an appointment to see the urologist for catheter removal tomorrow and patient still feeling weak and using the walker  Inpatient course: Discharge summary reviewed- see chart.    Interval

## 2024-08-27 ENCOUNTER — HOSPITAL ENCOUNTER (EMERGENCY)
Age: 84
Discharge: HOME | End: 2024-08-27
Attending: EMERGENCY MEDICINE
Payer: MEDICARE

## 2024-08-27 ENCOUNTER — APPOINTMENT (OUTPATIENT)
Dept: CT IMAGING | Age: 84
End: 2024-08-27
Payer: MEDICARE

## 2024-08-27 VITALS
OXYGEN SATURATION: 99 % | TEMPERATURE: 99.1 F | BODY MASS INDEX: 25.77 KG/M2 | RESPIRATION RATE: 19 BRPM | SYSTOLIC BLOOD PRESSURE: 154 MMHG | DIASTOLIC BLOOD PRESSURE: 81 MMHG | HEIGHT: 70 IN | HEART RATE: 84 BPM | WEIGHT: 180 LBS

## 2024-08-27 DIAGNOSIS — R33.8 ACUTE URINARY RETENTION: Primary | ICD-10-CM

## 2024-08-27 LAB
ALBUMIN SERPL-MCNC: 3.8 G/DL (ref 3.5–5.2)
ALP SERPL-CCNC: 156 U/L (ref 40–129)
ALT SERPL-CCNC: <5 U/L (ref 5–41)
ANION GAP SERPL CALCULATED.3IONS-SCNC: 10 MMOL/L (ref 9–17)
AST SERPL-CCNC: 20 U/L
BACTERIA URNS QL MICRO: ABNORMAL
BASOPHILS # BLD: 0.05 K/UL (ref 0–0.2)
BASOPHILS NFR BLD: 0 % (ref 0–2)
BILIRUB SERPL-MCNC: 1.2 MG/DL (ref 0.3–1.2)
BILIRUB UR QL STRIP: NEGATIVE
BUN SERPL-MCNC: 14 MG/DL (ref 8–23)
BUN/CREAT SERPL: 20 (ref 9–20)
CALCIUM SERPL-MCNC: 9 MG/DL (ref 8.6–10.4)
CHLORIDE SERPL-SCNC: 96 MMOL/L (ref 98–107)
CLARITY UR: CLEAR
CO2 SERPL-SCNC: 28 MMOL/L (ref 20–31)
COLOR UR: YELLOW
CREAT SERPL-MCNC: 0.7 MG/DL (ref 0.7–1.2)
EOSINOPHIL # BLD: 0.03 K/UL (ref 0–0.44)
EOSINOPHILS RELATIVE PERCENT: 0 % (ref 1–4)
EPI CELLS #/AREA URNS HPF: ABNORMAL /HPF (ref 0–5)
ERYTHROCYTE [DISTWIDTH] IN BLOOD BY AUTOMATED COUNT: 17.9 % (ref 11.8–14.4)
GFR, ESTIMATED: >90 ML/MIN/1.73M2
GLUCOSE SERPL-MCNC: 107 MG/DL (ref 70–99)
GLUCOSE UR STRIP-MCNC: NEGATIVE MG/DL
HCT VFR BLD AUTO: 34.3 % (ref 40.7–50.3)
HGB BLD-MCNC: 10.4 G/DL (ref 13–17)
HGB UR QL STRIP.AUTO: ABNORMAL
IMM GRANULOCYTES # BLD AUTO: 0.03 K/UL (ref 0–0.3)
IMM GRANULOCYTES NFR BLD: 0 %
KETONES UR STRIP-MCNC: NEGATIVE MG/DL
LEUKOCYTE ESTERASE UR QL STRIP: NEGATIVE
LYMPHOCYTES NFR BLD: 1.38 K/UL (ref 1.1–3.7)
LYMPHOCYTES RELATIVE PERCENT: 11 % (ref 24–43)
MCH RBC QN AUTO: 25.2 PG (ref 25.2–33.5)
MCHC RBC AUTO-ENTMCNC: 30.3 G/DL (ref 28.4–34.8)
MCV RBC AUTO: 83.1 FL (ref 82.6–102.9)
MONOCYTES NFR BLD: 1.32 K/UL (ref 0.1–1.2)
MONOCYTES NFR BLD: 11 % (ref 3–12)
NEUTROPHILS NFR BLD: 78 % (ref 36–65)
NEUTS SEG NFR BLD: 9.59 K/UL (ref 1.5–8.1)
NITRITE UR QL STRIP: NEGATIVE
NRBC BLD-RTO: 0 PER 100 WBC
PH UR STRIP: 7 [PH] (ref 5–8)
PLATELET # BLD AUTO: 303 K/UL (ref 138–453)
PMV BLD AUTO: 10.1 FL (ref 8.1–13.5)
POTASSIUM SERPL-SCNC: 3.9 MMOL/L (ref 3.7–5.3)
PROT SERPL-MCNC: 7.4 G/DL (ref 6.4–8.3)
PROT UR STRIP-MCNC: NEGATIVE MG/DL
RBC # BLD AUTO: 4.13 M/UL (ref 4.21–5.77)
RBC # BLD: ABNORMAL 10*6/UL
RBC #/AREA URNS HPF: ABNORMAL /HPF (ref 0–2)
SODIUM SERPL-SCNC: 134 MMOL/L (ref 135–144)
SP GR UR STRIP: 1.01 (ref 1–1.03)
UROBILINOGEN UR STRIP-ACNC: NORMAL EU/DL (ref 0–1)
WBC #/AREA URNS HPF: ABNORMAL /HPF (ref 0–5)
WBC OTHER # BLD: 12.4 K/UL (ref 3.5–11.3)

## 2024-08-27 PROCEDURE — 81001 URINALYSIS AUTO W/SCOPE: CPT

## 2024-08-27 PROCEDURE — 99285 EMERGENCY DEPT VISIT HI MDM: CPT

## 2024-08-27 PROCEDURE — 51798 US URINE CAPACITY MEASURE: CPT

## 2024-08-27 PROCEDURE — 51702 INSERT TEMP BLADDER CATH: CPT

## 2024-08-27 PROCEDURE — 6360000004 HC RX CONTRAST MEDICATION: Performed by: EMERGENCY MEDICINE

## 2024-08-27 PROCEDURE — 2580000003 HC RX 258: Performed by: EMERGENCY MEDICINE

## 2024-08-27 PROCEDURE — 85025 COMPLETE CBC W/AUTO DIFF WBC: CPT

## 2024-08-27 PROCEDURE — 6370000000 HC RX 637 (ALT 250 FOR IP): Performed by: EMERGENCY MEDICINE

## 2024-08-27 PROCEDURE — 80053 COMPREHEN METABOLIC PANEL: CPT

## 2024-08-27 PROCEDURE — 74177 CT ABD & PELVIS W/CONTRAST: CPT

## 2024-08-27 RX ORDER — 0.9 % SODIUM CHLORIDE 0.9 %
80 INTRAVENOUS SOLUTION INTRAVENOUS ONCE
Status: COMPLETED | OUTPATIENT
Start: 2024-08-27 | End: 2024-08-27

## 2024-08-27 RX ORDER — LIDOCAINE HYDROCHLORIDE 20 MG/ML
20 JELLY TOPICAL PRN
Status: DISCONTINUED | OUTPATIENT
Start: 2024-08-27 | End: 2024-08-27 | Stop reason: HOSPADM

## 2024-08-27 RX ORDER — IOPAMIDOL 755 MG/ML
75 INJECTION, SOLUTION INTRAVASCULAR
Status: COMPLETED | OUTPATIENT
Start: 2024-08-27 | End: 2024-08-27

## 2024-08-27 RX ORDER — SODIUM CHLORIDE 0.9 % (FLUSH) 0.9 %
10 SYRINGE (ML) INJECTION PRN
Status: DISCONTINUED | OUTPATIENT
Start: 2024-08-27 | End: 2024-08-27 | Stop reason: HOSPADM

## 2024-08-27 RX ADMIN — LIDOCAINE HYDROCHLORIDE 20 ML: 20 JELLY TOPICAL at 07:20

## 2024-08-27 RX ADMIN — IOPAMIDOL 75 ML: 755 INJECTION, SOLUTION INTRAVENOUS at 09:02

## 2024-08-27 RX ADMIN — SODIUM CHLORIDE, PRESERVATIVE FREE 10 ML: 5 INJECTION INTRAVENOUS at 09:03

## 2024-08-27 RX ADMIN — SODIUM CHLORIDE 80 ML: 9 INJECTION, SOLUTION INTRAVENOUS at 09:03

## 2024-08-27 ASSESSMENT — PAIN SCALES - GENERAL: PAINLEVEL_OUTOF10: 10

## 2024-08-27 NOTE — ED PROVIDER NOTES
EMERGENCY DEPARTMENT ENCOUNTER    Pt Name: Manjeet Hobson  MRN: [id number]  Birthdate 1940  Date of evaluation: 8/27/24  CHIEF COMPLAINT       Chief Complaint   Patient presents with    Constipation    Urinary Retention     Has urinated wife attempted to straight cath unable, bladder scanned greater than 999     HISTORY OF PRESENT ILLNESS   HPI  83-year-old male presents to the ED for inability to urinate since yesterday, constipation for the past 5 days.  Patient states that about 2 weeks ago he had endovascular aortic valve replacement, he initially had some issues with urinary retention after that, was able to have the Obregon removed prior to discharge.  Patient states that he has had prior episodes of urinary retention after surgery.  Patient states that since being home for the last few days he has been urinating less and less each day until yesterday when the urine completely stopped, he feels that he is unable to void.  Patient also notes that he has not had a good bowel movement in about 5 days, took a dose of MiraLAX yesterday.  Patient denies nausea/vomiting, he is still passing gas, he denies significant abdominal pain.  Patient denies pain to his right groin catheter site, chest pain, shortness of breath, fever/chills or any other acute complaints.    REVIEW OF SYSTEMS     Review of Systems   All other systems reviewed and are negative.    PASTMEDICAL HISTORY     Past Medical History:   Diagnosis Date    Asthma     Atrial fibrillation (HCC)     Cataracts, bilateral     COPD (chronic obstructive pulmonary disease) (HCC)     Diverticulosis     Hearing loss     Heart attack (HCC)     Hemorrhoids     Hyperlipidemia     Neuropathy     Spinal stenosis      SURGICAL HISTORY       Past Surgical History:   Procedure Laterality Date    CAROTID ENDARTERECTOMY Right 06/01/2018    CARPAL TUNNEL RELEASE Bilateral 11/4/19, 11/18/19    CERVICAL DISCECTOMY      CHOLECYSTECTOMY      CORONARY ANGIOPLASTY WITH STENT

## 2024-08-27 NOTE — DISCHARGE INSTRUCTIONS
Wear the Obregon catheter with a leg bag until you follow-up with a urologist.  Call the number provided above for Dr. Natarajan, he is a urologist you should follow-up with.  If you are catheter stops draining, you have severe pain or any other concerning symptoms come back to the ER.

## 2024-08-27 NOTE — ED NOTES
Pt came in with ems, c/o constipation and not being able to urinate with hip and pelvic pain. Pt abd was distended. Pt bladder scanned and bladder scan was greater than 999. Pt distention resolved after garcia placement. Pt stated wife attempted to straight cath x 2 with no success. Pt 5 days ago had a valve replacement.   Pt connected to cardiac monitor.    EKG completed.   IV initiated   Labs drawn and labeled.   Garcia cath placed, 1500ml urine removed.   Urine obtained, labeled and sent to lab.   Dr at bedside.   Family at bedside.   Warm blankets given.   Call light in reach.

## 2024-08-31 LAB
EKG ATRIAL RATE: 53 BPM
EKG Q-T INTERVAL: 396 MS
EKG QRS DURATION: 106 MS
EKG QTC CALCULATION (BAZETT): 467 MS
EKG R AXIS: -17 DEGREES
EKG T AXIS: 98 DEGREES
EKG VENTRICULAR RATE: 84 BPM

## 2024-09-13 LAB
LEFT VENTRICULAR EJECTION FRACTION HIGH VALUE: 55 %
LEFT VENTRICULAR EJECTION FRACTION MODE: NORMAL
LV EF: 50 %

## 2024-10-14 ENCOUNTER — APPOINTMENT (OUTPATIENT)
Dept: CT IMAGING | Age: 84
End: 2024-10-14
Payer: MEDICARE

## 2024-10-14 ENCOUNTER — HOSPITAL ENCOUNTER (EMERGENCY)
Age: 84
Discharge: HOME | End: 2024-10-14
Payer: MEDICARE

## 2024-10-14 ENCOUNTER — APPOINTMENT (OUTPATIENT)
Dept: GENERAL RADIOLOGY | Age: 84
End: 2024-10-14
Payer: MEDICARE

## 2024-10-14 VITALS
TEMPERATURE: 97.7 F | SYSTOLIC BLOOD PRESSURE: 149 MMHG | RESPIRATION RATE: 18 BRPM | DIASTOLIC BLOOD PRESSURE: 51 MMHG | HEART RATE: 56 BPM | BODY MASS INDEX: 25.68 KG/M2 | OXYGEN SATURATION: 97 % | WEIGHT: 179 LBS

## 2024-10-14 DIAGNOSIS — M25.511 RIGHT SHOULDER PAIN, UNSPECIFIED CHRONICITY: ICD-10-CM

## 2024-10-14 DIAGNOSIS — M79.641 RIGHT HAND PAIN: ICD-10-CM

## 2024-10-14 DIAGNOSIS — T07.XXXA MULTIPLE ABRASIONS: ICD-10-CM

## 2024-10-14 DIAGNOSIS — S09.90XA CLOSED HEAD INJURY, INITIAL ENCOUNTER: Primary | ICD-10-CM

## 2024-10-14 DIAGNOSIS — S00.01XA ABRASION OF SCALP, INITIAL ENCOUNTER: ICD-10-CM

## 2024-10-14 PROCEDURE — 73610 X-RAY EXAM OF ANKLE: CPT

## 2024-10-14 PROCEDURE — 72125 CT NECK SPINE W/O DYE: CPT

## 2024-10-14 PROCEDURE — 72192 CT PELVIS W/O DYE: CPT

## 2024-10-14 PROCEDURE — 99284 EMERGENCY DEPT VISIT MOD MDM: CPT

## 2024-10-14 PROCEDURE — 6360000002 HC RX W HCPCS

## 2024-10-14 PROCEDURE — 90715 TDAP VACCINE 7 YRS/> IM: CPT

## 2024-10-14 PROCEDURE — 71045 X-RAY EXAM CHEST 1 VIEW: CPT

## 2024-10-14 PROCEDURE — 73030 X-RAY EXAM OF SHOULDER: CPT

## 2024-10-14 PROCEDURE — 73562 X-RAY EXAM OF KNEE 3: CPT

## 2024-10-14 PROCEDURE — 70450 CT HEAD/BRAIN W/O DYE: CPT

## 2024-10-14 PROCEDURE — 90471 IMMUNIZATION ADMIN: CPT

## 2024-10-14 PROCEDURE — 73130 X-RAY EXAM OF HAND: CPT

## 2024-10-14 RX ADMIN — TETANUS TOXOID, REDUCED DIPHTHERIA TOXOID AND ACELLULAR PERTUSSIS VACCINE, ADSORBED 0.5 ML: 5; 2.5; 8; 8; 2.5 SUSPENSION INTRAMUSCULAR at 20:38

## 2024-10-14 ASSESSMENT — PAIN DESCRIPTION - DESCRIPTORS: DESCRIPTORS: TINGLING;THROBBING;ACHING

## 2024-10-14 ASSESSMENT — PAIN SCALES - GENERAL
PAINLEVEL_OUTOF10: 7
PAINLEVEL_OUTOF10: 3

## 2024-10-14 ASSESSMENT — PAIN DESCRIPTION - ORIENTATION: ORIENTATION: RIGHT

## 2024-10-14 ASSESSMENT — PAIN DESCRIPTION - FREQUENCY: FREQUENCY: CONTINUOUS

## 2024-10-14 ASSESSMENT — PAIN - FUNCTIONAL ASSESSMENT: PAIN_FUNCTIONAL_ASSESSMENT: 0-10

## 2024-10-14 NOTE — ED PROVIDER NOTES
[facility] [place] ED  Emergency Department Encounter  Emergency Medicine Attending     Pt Name:Manjeet Hobson  MRN: [id number]  Birthdate 1940  Date of evaluation: 10/14/24  PCP:  Aleks Boone MD  Note Started: 7:46 PM EDT      CHIEF COMPLAINT       Chief Complaint   Patient presents with    Head Injury     States fell 2 hrs ago, taking asa and pardaxa    skin tears to scalp and right shoulder    Knee Pain     right    Finger Injury     Right third digit    Neck Pain       HISTORY OF PRESENT ILLNESS  (Location/Symptom, Timing/Onset, Context/Setting, Quality, Duration, Modifying Factors, Severity.)      83-year-old male presenting for evaluation of ground-level fall.  Patient was walking into a restaurant and tripped on the carpet and fell forward.  Denies LOC.  He does take aspirin and Pradaxa.  Currently endorsing pain to his head, right hand, right knee, left ankle, right shoulder.  Unknown last tetanus.  Denies any lightheadedness or dizziness or chest pain or shortness of breath.  Patient was able to ambulate afterwards.            PAST MEDICAL / SURGICAL / SOCIAL / FAMILY HISTORY      has a past medical history of Asthma, Atrial fibrillation (HCC), Cataracts, bilateral, COPD (chronic obstructive pulmonary disease) (HCC), Diverticulosis, Hearing loss, Heart attack (HCC), Hemorrhoids, Hyperlipidemia, Neuropathy, and Spinal stenosis.     has a past surgical history that includes Tonsillectomy; Coronary artery bypass graft (05/29/2015); Coronary angioplasty with stent (2005); Cholecystectomy; hernia repair; Cervical discectomy; laminectomy; Carpal tunnel release (Bilateral, 11/4/19, 11/18/19); Carotid endarterectomy (Right, 06/01/2018); Mohs surgery (09/15/2019); and Aortic valve surgery.    Social History     Socioeconomic History    Marital status:      Spouse name: Not on file    Number of children: Not on file    Years of education: Not on file    Highest education level: Not on file

## 2024-10-14 NOTE — ED NOTES
Pt was at restaurant walking in and tripped and fell into corner of wall. Pt reports rug was in his way. Pt has multiple skin tares to head. Denies pain to head \"It burns a little bit\" Right shoulder and hand along with right knee is painful. \"I dont think I can stand on it now\" Pt is on blood thinners.

## 2024-10-15 NOTE — ED NOTES
Wound care to skin tares.   #1 Right anterior shoulder- #2 Right Frontal forehead #3 Left frontal forehead. #4 Posterior top of scalp all  Cleansed with peroxide/sterile H2o mix. Antibacterial ointment applied. Non-adherent pad to site taped with paper tape.    Pt tolerates well.   Boostrix administered as ordered.

## 2024-10-22 ENCOUNTER — OFFICE VISIT (OUTPATIENT)
Dept: FAMILY MEDICINE CLINIC | Age: 84
End: 2024-10-22

## 2024-10-22 VITALS
HEART RATE: 76 BPM | HEIGHT: 70 IN | WEIGHT: 179 LBS | BODY MASS INDEX: 25.62 KG/M2 | OXYGEN SATURATION: 96 % | DIASTOLIC BLOOD PRESSURE: 58 MMHG | TEMPERATURE: 97.5 F | SYSTOLIC BLOOD PRESSURE: 116 MMHG

## 2024-10-22 DIAGNOSIS — J41.0 SIMPLE CHRONIC BRONCHITIS (HCC): ICD-10-CM

## 2024-10-22 DIAGNOSIS — N13.8 BPH WITH URINARY OBSTRUCTION: ICD-10-CM

## 2024-10-22 DIAGNOSIS — R60.0 LEG EDEMA: ICD-10-CM

## 2024-10-22 DIAGNOSIS — I48.0 PAROXYSMAL ATRIAL FIBRILLATION (HCC): ICD-10-CM
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
## 2024-10-22 DIAGNOSIS — S89.91XA INJURY OF RIGHT KNEE, INITIAL ENCOUNTER: ICD-10-CM

## 2024-10-22 DIAGNOSIS — N40.1 BPH WITH URINARY OBSTRUCTION: ICD-10-CM

## 2024-10-22 DIAGNOSIS — S01.01XA LACERATION OF SCALP, INITIAL ENCOUNTER: ICD-10-CM

## 2024-10-22 RX ORDER — ALBUTEROL SULFATE 90 UG/1
2 INHALANT RESPIRATORY (INHALATION) EVERY 6 HOURS PRN
COMMUNITY
Start: 2024-07-14

## 2024-10-22 RX ORDER — LISINOPRIL 5 MG/1
5 TABLET ORAL DAILY
COMMUNITY
Start: 2024-08-27 | End: 2025-08-27

## 2024-10-22 RX ORDER — METOPROLOL SUCCINATE 25 MG/1
25 TABLET, EXTENDED RELEASE ORAL DAILY
COMMUNITY
Start: 2024-10-08 | End: 2025-10-08

## 2024-10-22 RX ORDER — FUROSEMIDE 20 MG/1
20 TABLET ORAL DAILY
COMMUNITY
Start: 2024-08-27 | End: 2025-08-27

## 2024-10-22 NOTE — PROGRESS NOTES
Manjeet Hobson is a 83 y.o. male who presents for   Chief Complaint   Patient presents with    ED Follow-up    Fall    Discuss Medications     lasix    and follow up of chronic medical problems.  Patient Active Problem List   Diagnosis    COPD (chronic obstructive pulmonary disease) (HCC)    Paroxysmal atrial fibrillation (HCC)    Encounter for medication review and counseling     HPI  Here for follow-up after the hospital visit after a fall while he was going into the restaurant and tripped on the carpet and fell down and patient had injuries including laceration on the scalp and bruising on knee on the right side and patient has been seen in the emergency room and had multiple x-rays and CT scans done and did not show any fracture and patient now slowly improving  And also following with urology for his BPH and has the catheter removed and patient having frequency of urine and having small amounts and planning for UroLift next week  Also complains of neck and back and leg pain going on for a long time and has not been following with pain management    Current Outpatient Medications   Medication Sig Dispense Refill    metoprolol succinate (TOPROL XL) 25 MG extended release tablet Take 1 tablet by mouth daily      lisinopril (PRINIVIL;ZESTRIL) 5 MG tablet Take 1 tablet by mouth daily      furosemide (LASIX) 20 MG tablet Take 1 tablet by mouth daily      Menaquinone-7 40 MCG TABS Take by mouth      albuterol sulfate HFA (PROVENTIL;VENTOLIN;PROAIR) 108 (90 Base) MCG/ACT inhaler Inhale 2 puffs into the lungs every 6 hours as needed      cephALEXin (KEFLEX) 500 MG capsule       oxyBUTYnin (DITROPAN) 5 MG tablet Take 1 tablet by mouth 2 times daily      ZINC PO Take by mouth      Coenzyme Q10 (CO Q10) 100 MG CAPS Take by mouth      atorvastatin (LIPITOR) 40 MG tablet TAKE 1 TABLET DAILY (Patient taking differently: Take 10 mg by mouth nightly TAKE 1 TABLET DAILY) 90 tablet 1    acetaminophen (TYLENOL) 500 MG tablet Take

## 2024-12-05 ENCOUNTER — OFFICE VISIT (OUTPATIENT)
Dept: FAMILY MEDICINE CLINIC | Age: 84
End: 2024-12-05
Payer: MEDICARE

## 2024-12-05 VITALS
WEIGHT: 184 LBS | OXYGEN SATURATION: 96 % | BODY MASS INDEX: 26.34 KG/M2 | HEIGHT: 70 IN | DIASTOLIC BLOOD PRESSURE: 58 MMHG | TEMPERATURE: 97.2 F | SYSTOLIC BLOOD PRESSURE: 124 MMHG | HEART RATE: 69 BPM | RESPIRATION RATE: 16 BRPM

## 2024-12-05 DIAGNOSIS — L98.9 SKIN LESION: ICD-10-CM

## 2024-12-05 DIAGNOSIS — I50.32 HEART FAILURE WITH IMPROVED EJECTION FRACTION (HFIMPEF) (HCC): ICD-10-CM

## 2024-12-05 DIAGNOSIS — I72.4: ICD-10-CM

## 2024-12-05 DIAGNOSIS — N13.8 BPH WITH URINARY OBSTRUCTION: ICD-10-CM

## 2024-12-05 DIAGNOSIS — N40.1 BPH WITH URINARY OBSTRUCTION: ICD-10-CM

## 2024-12-05 DIAGNOSIS — Z95.2 S/P TAVR (TRANSCATHETER AORTIC VALVE REPLACEMENT): ICD-10-CM

## 2024-12-05 DIAGNOSIS — R33.9 URINARY RETENTION: ICD-10-CM

## 2024-12-05 DIAGNOSIS — I48.0 PAROXYSMAL ATRIAL FIBRILLATION (HCC): ICD-10-CM

## 2024-12-05 DIAGNOSIS — J41.0 SIMPLE CHRONIC BRONCHITIS (HCC): ICD-10-CM

## 2024-12-05 DIAGNOSIS — M79.2 NEUROPATHIC PAIN: ICD-10-CM

## 2024-12-05 DIAGNOSIS — Z76.89 ENCOUNTER TO ESTABLISH CARE: Primary | ICD-10-CM

## 2024-12-05 PROBLEM — Z95.3 S/P TAVR (TRANSCATHETER AORTIC VALVE REPLACEMENT), BIOPROSTHETIC: Status: ACTIVE | Noted: 2024-09-13

## 2024-12-05 PROBLEM — H90.5 SENSORINEURAL HEARING LOSS (SNHL): Status: ACTIVE | Noted: 2024-12-05

## 2024-12-05 PROBLEM — Z95.1 HISTORY OF CORONARY ARTERY BYPASS SURGERY: Status: ACTIVE | Noted: 2024-12-05

## 2024-12-05 PROBLEM — I10 ESSENTIAL (PRIMARY) HYPERTENSION: Status: ACTIVE | Noted: 2023-07-31

## 2024-12-05 PROBLEM — E78.2 MIXED HYPERLIPIDEMIA: Status: ACTIVE | Noted: 2024-12-05

## 2024-12-05 PROBLEM — I35.0 NONRHEUMATIC AORTIC VALVE STENOSIS: Status: ACTIVE | Noted: 2024-07-15

## 2024-12-05 PROCEDURE — 3074F SYST BP LT 130 MM HG: CPT | Performed by: FAMILY MEDICINE

## 2024-12-05 PROCEDURE — G8419 CALC BMI OUT NRM PARAM NOF/U: HCPCS | Performed by: FAMILY MEDICINE

## 2024-12-05 PROCEDURE — G8427 DOCREV CUR MEDS BY ELIG CLIN: HCPCS | Performed by: FAMILY MEDICINE

## 2024-12-05 PROCEDURE — 3078F DIAST BP <80 MM HG: CPT | Performed by: FAMILY MEDICINE

## 2024-12-05 PROCEDURE — G8482 FLU IMMUNIZE ORDER/ADMIN: HCPCS | Performed by: FAMILY MEDICINE

## 2024-12-05 PROCEDURE — 1159F MED LIST DOCD IN RCRD: CPT | Performed by: FAMILY MEDICINE

## 2024-12-05 PROCEDURE — 1036F TOBACCO NON-USER: CPT | Performed by: FAMILY MEDICINE

## 2024-12-05 PROCEDURE — 3023F SPIROM DOC REV: CPT | Performed by: FAMILY MEDICINE

## 2024-12-05 PROCEDURE — 1123F ACP DISCUSS/DSCN MKR DOCD: CPT | Performed by: FAMILY MEDICINE

## 2024-12-05 PROCEDURE — 99214 OFFICE O/P EST MOD 30 MIN: CPT | Performed by: FAMILY MEDICINE

## 2024-12-05 RX ORDER — HYOSCYAMINE SULFATE 0.125 MG
TABLET ORAL
COMMUNITY
Start: 2024-10-25

## 2024-12-05 ASSESSMENT — ENCOUNTER SYMPTOMS
RESPIRATORY NEGATIVE: 1
GASTROINTESTINAL NEGATIVE: 1
EYES NEGATIVE: 1
ALLERGIC/IMMUNOLOGIC NEGATIVE: 1

## 2024-12-05 NOTE — PROGRESS NOTES
left carotid artery    Sensorineural hearing loss (SNHL)    Heart failure with improved ejection fraction (HFimpEF) (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)     Cataracts, bilateral     COPD (chronic obstructive pulmonary disease) ([facility])     Diverticulosis     Essential (primary) hypertension 07/31/2023    Hearing loss     Heart attack ([facility])     Heart failure with improved ejection fraction (HFimpEF) ([facility]) 09/20/2024    Hemorrhoids     History of coronary artery bypass surgery 12/05/2024    Hyperlipidemia     Mixed hyperlipidemia 12/05/2024    Neuropathy     Nonrheumatic aortic valve stenosis 07/15/2024    Paroxysmal atrial fibrillation ([facility]) 09/09/2016    S/p TAVR (transcatheter aortic valve replacement), bioprosthetic 09/13/2024    Sensorineural hearing loss (SNHL) 12/05/2024    Spinal stenosis        Past Surgical History:   Procedure Laterality Date    AORTIC VALVE SURGERY      CAROTID ENDARTERECTOMY Right 06/01/2018    CARPAL TUNNEL RELEASE Bilateral 11/4/19, 11/18/19    CERVICAL DISCECTOMY      CHOLECYSTECTOMY      CORONARY ANGIOPLASTY WITH STENT PLACEMENT  2005    AN to LAD    CORONARY ARTERY BYPASS GRAFT  05/29/2015    HERNIA REPAIR      LAMINECTOMY      MOHS SURGERY  09/15/2019    TONSILLECTOMY          Social History     Socioeconomic History    Marital status:      Spouse name: None    Number of children: None    Years of education: None    Highest education level: None   Tobacco Use    Smoking status:

## 2025-05-07 ENCOUNTER — OFFICE VISIT (OUTPATIENT)
Dept: FAMILY MEDICINE CLINIC | Age: 85
End: 2025-05-07
Payer: MEDICARE

## 2025-05-07 VITALS
BODY MASS INDEX: 26.26 KG/M2 | SYSTOLIC BLOOD PRESSURE: 140 MMHG | DIASTOLIC BLOOD PRESSURE: 60 MMHG | OXYGEN SATURATION: 96 % | WEIGHT: 183.4 LBS | HEART RATE: 67 BPM | HEIGHT: 70 IN

## 2025-05-07 DIAGNOSIS — L98.9 SKIN LESION: ICD-10-CM

## 2025-05-07 DIAGNOSIS — R20.2 NUMBNESS AND TINGLING OF BOTH LOWER EXTREMITIES: ICD-10-CM

## 2025-05-07 DIAGNOSIS — Z95.1 HISTORY OF CORONARY ARTERY BYPASS SURGERY: ICD-10-CM

## 2025-05-07 DIAGNOSIS — I50.32 HEART FAILURE WITH IMPROVED EJECTION FRACTION (HFIMPEF) (HCC): ICD-10-CM

## 2025-05-07 DIAGNOSIS — J41.0 SIMPLE CHRONIC BRONCHITIS (HCC): ICD-10-CM

## 2025-05-07 DIAGNOSIS — M79.2 NEUROPATHIC PAIN: ICD-10-CM

## 2025-05-07 DIAGNOSIS — R20.0 NUMBNESS AND TINGLING OF BOTH LOWER EXTREMITIES: ICD-10-CM

## 2025-05-07 DIAGNOSIS — I48.0 PAROXYSMAL ATRIAL FIBRILLATION (HCC): ICD-10-CM

## 2025-05-07 DIAGNOSIS — E55.9 VITAMIN D INSUFFICIENCY: ICD-10-CM

## 2025-05-07 DIAGNOSIS — Z71.89 ENCOUNTER FOR MEDICATION REVIEW AND COUNSELING: ICD-10-CM

## 2025-05-07 DIAGNOSIS — N13.8 BPH WITH URINARY OBSTRUCTION: ICD-10-CM

## 2025-05-07 DIAGNOSIS — Z00.00 MEDICARE ANNUAL WELLNESS VISIT, SUBSEQUENT: Primary | ICD-10-CM

## 2025-05-07 DIAGNOSIS — R41.3 MEMORY IMPAIRMENT: ICD-10-CM

## 2025-05-07 DIAGNOSIS — I72.4: ICD-10-CM

## 2025-05-07 DIAGNOSIS — N40.1 BPH WITH URINARY OBSTRUCTION: ICD-10-CM

## 2025-05-07 DIAGNOSIS — Z13.1 DIABETES MELLITUS SCREENING: ICD-10-CM

## 2025-05-07 DIAGNOSIS — I25.10 ARTERIOSCLEROSIS OF CORONARY ARTERY: ICD-10-CM

## 2025-05-07 DIAGNOSIS — I10 ESSENTIAL (PRIMARY) HYPERTENSION: ICD-10-CM

## 2025-05-07 DIAGNOSIS — E78.2 MIXED HYPERLIPIDEMIA: ICD-10-CM

## 2025-05-07 DIAGNOSIS — Z95.2 S/P TAVR (TRANSCATHETER AORTIC VALVE REPLACEMENT): ICD-10-CM

## 2025-05-07 PROCEDURE — G0439 PPPS, SUBSEQ VISIT: HCPCS | Performed by: FAMILY MEDICINE

## 2025-05-07 PROCEDURE — 1159F MED LIST DOCD IN RCRD: CPT | Performed by: FAMILY MEDICINE

## 2025-05-07 PROCEDURE — 1125F AMNT PAIN NOTED PAIN PRSNT: CPT | Performed by: FAMILY MEDICINE

## 2025-05-07 PROCEDURE — 3077F SYST BP >= 140 MM HG: CPT | Performed by: FAMILY MEDICINE

## 2025-05-07 PROCEDURE — 1160F RVW MEDS BY RX/DR IN RCRD: CPT | Performed by: FAMILY MEDICINE

## 2025-05-07 PROCEDURE — 1123F ACP DISCUSS/DSCN MKR DOCD: CPT | Performed by: FAMILY MEDICINE

## 2025-05-07 PROCEDURE — 3078F DIAST BP <80 MM HG: CPT | Performed by: FAMILY MEDICINE

## 2025-05-07 SDOH — ECONOMIC STABILITY: FOOD INSECURITY: WITHIN THE PAST 12 MONTHS, YOU WORRIED THAT YOUR FOOD WOULD RUN OUT BEFORE YOU GOT MONEY TO BUY MORE.: NEVER TRUE

## 2025-05-07 SDOH — ECONOMIC STABILITY: FOOD INSECURITY: WITHIN THE PAST 12 MONTHS, THE FOOD YOU BOUGHT JUST DIDN'T LAST AND YOU DIDN'T HAVE MONEY TO GET MORE.: NEVER TRUE

## 2025-05-07 ASSESSMENT — PATIENT HEALTH QUESTIONNAIRE - PHQ9
SUM OF ALL RESPONSES TO PHQ QUESTIONS 1-9: 0
2. FEELING DOWN, DEPRESSED OR HOPELESS: NOT AT ALL
SUM OF ALL RESPONSES TO PHQ QUESTIONS 1-9: 0
SUM OF ALL RESPONSES TO PHQ QUESTIONS 1-9: 0
1. LITTLE INTEREST OR PLEASURE IN DOING THINGS: NOT AT ALL
SUM OF ALL RESPONSES TO PHQ QUESTIONS 1-9: 0

## 2025-05-07 ASSESSMENT — LIFESTYLE VARIABLES
HOW OFTEN DO YOU HAVE A DRINK CONTAINING ALCOHOL: NEVER
HOW MANY STANDARD DRINKS CONTAINING ALCOHOL DO YOU HAVE ON A TYPICAL DAY: PATIENT DOES NOT DRINK

## 2025-05-07 NOTE — PROGRESS NOTES
Medicare Annual Wellness Visit    Manjeet Hobson is here for Medicare AWV (Follows with:/Cardiology: Jonathan Ochoa MD - [facility]/Urology: Lenny bAarca MD /Cardiothoracic Surgery: Octavio Archuleta MD - [facility]/Pain Management: Darren Jackson MD - [facility]/Pulmonology: ZACH QUIJANO/)    Assessment & Plan   Medicare annual wellness visit, subsequent  Paroxysmal atrial fibrillation (HCC)  Comments:  Monitored by specialist- no acute findings meriting change in the plan  Orders:  -     CBC with Auto Differential; Future  -     Comprehensive Metabolic Panel; Future  -     TSH; Future  Heart failure with improved ejection fraction (HFimpEF) (HCC)  Comments:  Monitored by specialist- no acute findings meriting change in the plan  Orders:  -     Comprehensive Metabolic Panel; Future  -     Lipid Panel; Future  -     Magnesium; Future  -     TSH; Future  Arteriosclerosis of coronary artery  Comments:  Monitored by specialist- no acute findings meriting change in the plan  Orders:  -     Comprehensive Metabolic Panel; Future  -     Lipid Panel; Future  -     Magnesium; Future  -     TSH; Future  History of coronary artery bypass surgery  Comments:  Monitored by specialist- no acute findings meriting change in the plan  S/P TAVR (transcatheter aortic valve replacement)  Comments:  Monitored by specialist- no acute findings meriting change in the plan  Essential (primary) hypertension  Comments:  Well-controlled, continue current medications, continue current treatment plan, medication adherence emphasized, and lifestyle modifications recommended  Orders:  -     Comprehensive Metabolic Panel; Future  -     Magnesium; Future  -     TSH; Future  -     Urinalysis; Future  Mixed hyperlipidemia  Comments:  Well-controlled, continue current medications, continue current treatment plan, medication adherence emphasized, and lifestyle modifications recommended  Orders:  -     Lipid Panel; Future  Simple chronic bronchitis